# Patient Record
Sex: FEMALE | Race: WHITE | NOT HISPANIC OR LATINO | Employment: FULL TIME | URBAN - METROPOLITAN AREA
[De-identification: names, ages, dates, MRNs, and addresses within clinical notes are randomized per-mention and may not be internally consistent; named-entity substitution may affect disease eponyms.]

---

## 2017-04-30 ENCOUNTER — HOSPITAL ENCOUNTER (INPATIENT)
Facility: CLINIC | Age: 32
LOS: 2 days | Discharge: HOME | DRG: 897 | End: 2017-05-02
Attending: EMERGENCY MEDICINE | Admitting: PSYCHIATRY & NEUROLOGY
Payer: COMMERCIAL

## 2017-04-30 DIAGNOSIS — G89.29 CHRONIC NONMALIGNANT PAIN: Primary | Chronic | ICD-10-CM

## 2017-04-30 DIAGNOSIS — F11.23 OPIOID DEPENDENCE WITH WITHDRAWAL (H): ICD-10-CM

## 2017-04-30 DIAGNOSIS — F11.20 OPIOID USE DISORDER, SEVERE, DEPENDENCE (H): Chronic | ICD-10-CM

## 2017-04-30 PROBLEM — F11.93 OPIATE WITHDRAWAL (H): Status: ACTIVE | Noted: 2017-04-30

## 2017-04-30 LAB
AMPHETAMINES UR QL SCN: ABNORMAL
BARBITURATES UR QL: ABNORMAL
BENZODIAZ UR QL: ABNORMAL
CANNABINOIDS UR QL SCN: ABNORMAL
COCAINE UR QL: ABNORMAL
ETHANOL UR QL SCN: ABNORMAL
HCG UR QL: NEGATIVE
OPIATES UR QL SCN: ABNORMAL

## 2017-04-30 PROCEDURE — 99285 EMERGENCY DEPT VISIT HI MDM: CPT | Mod: 25 | Performed by: EMERGENCY MEDICINE

## 2017-04-30 PROCEDURE — 81025 URINE PREGNANCY TEST: CPT | Performed by: FAMILY MEDICINE

## 2017-04-30 PROCEDURE — 25000132 ZZH RX MED GY IP 250 OP 250 PS 637: Performed by: PSYCHIATRY & NEUROLOGY

## 2017-04-30 PROCEDURE — 12800008 ZZH R&B CD ADULT

## 2017-04-30 PROCEDURE — HZ2ZZZZ DETOXIFICATION SERVICES FOR SUBSTANCE ABUSE TREATMENT: ICD-10-PCS | Performed by: PSYCHIATRY & NEUROLOGY

## 2017-04-30 PROCEDURE — 80320 DRUG SCREEN QUANTALCOHOLS: CPT | Performed by: FAMILY MEDICINE

## 2017-04-30 PROCEDURE — 25000132 ZZH RX MED GY IP 250 OP 250 PS 637: Performed by: EMERGENCY MEDICINE

## 2017-04-30 PROCEDURE — 80307 DRUG TEST PRSMV CHEM ANLYZR: CPT | Performed by: FAMILY MEDICINE

## 2017-04-30 PROCEDURE — 99285 EMERGENCY DEPT VISIT HI MDM: CPT | Mod: Z6 | Performed by: EMERGENCY MEDICINE

## 2017-04-30 RX ORDER — ALUMINA, MAGNESIA, AND SIMETHICONE 2400; 2400; 240 MG/30ML; MG/30ML; MG/30ML
30 SUSPENSION ORAL EVERY 4 HOURS PRN
Status: DISCONTINUED | OUTPATIENT
Start: 2017-04-30 | End: 2017-05-02 | Stop reason: HOSPADM

## 2017-04-30 RX ORDER — OXYCODONE HYDROCHLORIDE 20 MG/1
1 TABLET ORAL EVERY 6 HOURS PRN
Status: ON HOLD | COMMUNITY
End: 2017-05-02

## 2017-04-30 RX ORDER — NAPROXEN 500 MG/1
500 TABLET ORAL 2 TIMES DAILY PRN
Status: DISCONTINUED | OUTPATIENT
Start: 2017-04-30 | End: 2017-05-02 | Stop reason: HOSPADM

## 2017-04-30 RX ORDER — NICOTINE 21 MG/24HR
1 PATCH, TRANSDERMAL 24 HOURS TRANSDERMAL DAILY
Status: DISCONTINUED | OUTPATIENT
Start: 2017-04-30 | End: 2017-04-30 | Stop reason: ALTCHOICE

## 2017-04-30 RX ORDER — BUPRENORPHINE 2 MG/1
4 TABLET SUBLINGUAL 2 TIMES DAILY
Status: DISCONTINUED | OUTPATIENT
Start: 2017-04-30 | End: 2017-05-02

## 2017-04-30 RX ORDER — HYDROXYZINE HYDROCHLORIDE 25 MG/1
25-50 TABLET, FILM COATED ORAL EVERY 4 HOURS PRN
Status: DISCONTINUED | OUTPATIENT
Start: 2017-04-30 | End: 2017-05-02 | Stop reason: HOSPADM

## 2017-04-30 RX ORDER — BISACODYL 10 MG
10 SUPPOSITORY, RECTAL RECTAL DAILY PRN
Status: DISCONTINUED | OUTPATIENT
Start: 2017-04-30 | End: 2017-05-02 | Stop reason: HOSPADM

## 2017-04-30 RX ORDER — TRAZODONE HYDROCHLORIDE 50 MG/1
50 TABLET, FILM COATED ORAL
Status: DISCONTINUED | OUTPATIENT
Start: 2017-04-30 | End: 2017-05-02 | Stop reason: HOSPADM

## 2017-04-30 RX ORDER — NAPROXEN 500 MG/1
500 TABLET ORAL PRN
Status: ON HOLD | COMMUNITY
End: 2017-05-02

## 2017-04-30 RX ORDER — IBUPROFEN 600 MG/1
600 TABLET, FILM COATED ORAL ONCE
Status: COMPLETED | OUTPATIENT
Start: 2017-04-30 | End: 2017-04-30

## 2017-04-30 RX ORDER — ACETAMINOPHEN 325 MG/1
650 TABLET ORAL EVERY 4 HOURS PRN
Status: DISCONTINUED | OUTPATIENT
Start: 2017-04-30 | End: 2017-05-02 | Stop reason: HOSPADM

## 2017-04-30 RX ORDER — NALOXONE HYDROCHLORIDE 0.4 MG/ML
.1-.4 INJECTION, SOLUTION INTRAMUSCULAR; INTRAVENOUS; SUBCUTANEOUS
Status: DISCONTINUED | OUTPATIENT
Start: 2017-04-30 | End: 2017-05-02 | Stop reason: HOSPADM

## 2017-04-30 RX ADMIN — IBUPROFEN 600 MG: 600 TABLET ORAL at 12:09

## 2017-04-30 RX ADMIN — BUPRENORPHINE HCL 4 MG: 2 TABLET SUBLINGUAL at 20:19

## 2017-04-30 RX ADMIN — NICOTINE POLACRILEX 8 MG: 4 GUM, CHEWING ORAL at 20:29

## 2017-04-30 RX ADMIN — TRAZODONE HYDROCHLORIDE 50 MG: 50 TABLET ORAL at 23:07

## 2017-04-30 RX ADMIN — NICOTINE 1 PATCH: 21 PATCH, EXTENDED RELEASE TRANSDERMAL at 16:37

## 2017-04-30 RX ADMIN — NAPROXEN 500 MG: 500 TABLET ORAL at 20:19

## 2017-04-30 RX ADMIN — BUPRENORPHINE HCL 4 MG: 2 TABLET SUBLINGUAL at 15:04

## 2017-04-30 ASSESSMENT — ENCOUNTER SYMPTOMS
SHORTNESS OF BREATH: 0
CHILLS: 1
EYE REDNESS: 0
HEADACHES: 0
ARTHRALGIAS: 0
CONFUSION: 0
COLOR CHANGE: 0
DIFFICULTY URINATING: 0
ABDOMINAL PAIN: 0
NECK STIFFNESS: 0
FEVER: 0

## 2017-04-30 ASSESSMENT — ACTIVITIES OF DAILY LIVING (ADL)
GROOMING: INDEPENDENT
DRESS: INDEPENDENT
ORAL_HYGIENE: INDEPENDENT
GROOMING: INDEPENDENT
LAUNDRY: WITH SUPERVISION

## 2017-04-30 NOTE — PLAN OF CARE
Problem: Substance Withdrawal  Goal: Substance Withdrawal  Signs and symptoms of listed problems will be absent or manageable.  SUBSTANCE WITHDRAWAL CAREPLAN GOALS    1) Patient will achieve medical stabilization of acute withdrawal sx.  2) Patient will remain safe and free from injury  3) Patient will demonstrate improvement of ADLs (appetite, hygiene)  4) Verbalize reduction of fear or anxiety to a manageable level.  5) Verbalize knowledge of substance abuse as a disease  6) Verbalize risks and negative effects related to drug ingestion  7) Demonstrate participation in unit programming and attends specific substance use group therapy (i.e AA meetings)  8) Accept referral to substance abuse treatment  9) Express sense of regaining some control of situation/life (possible by verbalizing alternative coping mechanisms as alternatives to substance use in response to stress)       S:  The patient is a 31 year-old  female who comes to us from the ED to withdraw from opiates.  She has been on Oxycodone for 8 years for back pain and is taking 20 mg a day per her doctor.  In the past year she has begun smoking heroin 1/4-1/2 of a gram a day.  Her last use was yesterday at noon.  In addition she snorts cocaine.  Her last use was a couple of weeks ago.  She also uses occasional Adderall.  Her last use was a few days ago.  Her COWS was a 9  On admission.  B:  The patient grew up in [place].  Her father is  and her mother is alive.  She states that she believes that her mother is an alcoholic.  She has one sister who brought her to the ED.  She has lived with her fiance for the past 17 years and they have 2 children together, an 11 year-old and a 9 year-old.  The patient graduated from high school and has worked in the past doing  work.  She is currently unemployed.  Her SO is a  and is also abusing heroin.  She states that her stressors are money, CD issues and employment.  She denies  that she has ever been in treatment or had an inpatient stay from .  The ED stated that she had a history of being on Prozac and Buspar.  She has arthritis and some degenerative disc problems.  A:  The patient is now in withdrawal.  She denies that she is depressed or that she has every had SI or SIB.  She also denies that she has issues with anxiety.    R:  Continue to monitor her withdrawal and to medicate prn.

## 2017-04-30 NOTE — IP AVS SNAPSHOT
MRN:[id number]                      After Visit Summary   4/30/2017    Mecca Thorne    MRN: [id number]           Thank you!     Thank you for choosing [facility] for your care. Our goal is always to provide you with excellent care.        Patient Information     Date Of Birth          1985        Designated Caregiver       Most Recent Value    Caregiver    Will someone help with your care after discharge? no      About your hospital stay     You were admitted on:  April 30, 2017 You last received care in the:  [facility]    You were discharged on:  May 2, 2017       Who to Call     For medical emergencies, please call 911.  For non-urgent questions about your medical care, please call your primary care provider or clinic, [phone]          Attending Provider     Provider Specialty    Satish Sanon MD Emergency Medicine    Luis Alberto, Deepali Iniguez MD Pediatrics       Primary Care Provider Office Phone # Fax #    M Erum Marx [phone]       [facility] [address] [place] [id number]        Your next 10 appointments already scheduled     May 02, 2017  2:00 PM CDT   Treatment with LP/DOP ADMISSIONS   [facility] ([facility])    [address] [id number] [phone]            May 10, 2017  3:40 PM CDT   New Visit with Mecca Byrne MD   [facility] ([facility])    [address] [place] [address]  [address]  [facility] [id number] [phone]            May 11, 2017 12:00 PM CDT   Level 1 with UR BD 01   [facility] ([facility])    [address] [place] [address] S.  [address]  [facility] [address]   [phone]              Further instructions from your care team       Behavioral [facility] Discharge Planning and  Instructions  THANK YOU FOR CHOOSING THE [facility]  [facility]: [phone]    Summary: You were admitted to and processed through [facility] on April 30, 2017 for detoxification from opioids.  A medical examination was performed that included lab work. You have met with a  and opted to begin intensive outpatient treatment with lodging at [facility].  Please make your recovery a priority, Miss Thorne.    Recommendation:  Residential Treatment, psychotherapy, sober support group engagement and active work with a sponsor or  through [facility] (see below for contact information).    Main Diagnosis:  Opioid Dependence    Major Treatments, Procedures and Findings:  You were detoxified from opioids using the appropriate protocol(s). You have had a chemical dependency assessment.  You have had blood drawn, and the results have been reviewed with you.  Please take a copy of your laboratory work with you to your next provider appointment.    Symptoms to Report:  If you experience more anxiety, confusion, sleeplessness, deep sadness or thoughts of suicide, notify your treatment team or notify your primary care physician. IF THE SYMPTOMS YOU ARE EXPERIENCING ARE A MEDICAL EMERGENCY, CALL 911 IMMEDIATELY.     Lifestyle Adjustment: Adjust your lifestyle to get enough sleep, relaxation, exercise and excellent nutrition. Continue to develop healthy coping skills to decrease stress and promote a sober living environment. Do not use alcohol, illegal drugs or addictive medications other than what is currently prescribed. AA, NA, and a sponsor are excellent resources for support.     Primary Provider: Dr. Marx of [facility])  Patient states that she will finalize appointment time once secured in [facility].     Resources:  [facility] [phone] Support Group:  AA/NA and Sponsor/support.  Crisis Intervention:  307.732.6222 or 187-769-3548 (TTY: [phone]). Call anytime for help.  National [place] on Mental Illness (www.[place].salima.org): 301.726.6876 or 478-299-3476.  Alcoholics Anonymous (www.alcoholics-anonymous.org): Check your phone book for your local chapter.  Suicide Awareness Voices of Education (www.save.org): 613-865-GLWB (7004)  National Suicide Prevention Line (www.mentalhealthmn.org): 487-807-BZFB (1502)  Mental Health Consumer/Survivor Network of [place] (www.mhcsn.net): 364.552.8198 or 593-079-8358.  Mental Health Association of [place] (www.mentalhealth.org): 632.592.9646 or 051-481-6857  Substance Abuse and Mental Health Services. (www.samhsa.gov)    [facility] ([facility])  [facility] connects people seeking recovery to resources that help foster and sustain long-term recovery.  Whether you are seeking resources for treatment, transportation, housing, job training, education, health care or other pathways to recovery, [facility] is a great place to start. 447.514.8260 www.[facility].org    General Medication Instruction: See your medication papers for instructions. Take all medicines as directed.  Make no changes unless your doctor suggests them. Go to all your doctor visits.  Be sure to have all your required lab tests. This way, your medicines may be refilled on time. Do not use any drugs not prescribed by your provider. AA/NA and sponsors are excellent resources for support. Avoid alcohol at all costs!    Please Note:  If you have any questions at anytime after you are discharged please call the [facility] [place] detoxification [facility] at [phone].  [facility], Behavioral Intake [phone]. Please take this discharge folder with you to all your follow up appointments, it contains your lab results, diagnosis, medication list and discharge recommendations. THANK YOU FOR CHOOSING THE [facility]        Pending Results     Date and  "Time Order Name Status Description    2017 0816 Hepatitis C Screen Reflex to HCV RNA Quant and Genotype In process     2017 0816 Hepatitis B surface antigen In process     2017 0816 Hepatitis B core antibody In process             Admission Information     Date & Time Provider Department Dept. Phone    2017 Deepali Sahu MD [facility] [phone]      Your Vitals Were     Blood Pressure Pulse Temperature Respirations Height Weight    113/69 78 98.9  F (37.2  C) 16 1.829 m (6') 74.1 kg (163 lb 6.4 oz)    Last Period Pulse Oximetry BMI (Body Mass Index)             2017 (Exact Date) 100% 22.16 kg/m2         [facility] Information     [employer] lets you send messages to your doctor, view your test results, renew your prescriptions, schedule appointments and more. To sign up, go to [email or web address] . Click on \"Log in\" on the left side of the screen, which will take you to the Welcome page. Then click on \"Sign up Now\" on the right side of the page.     You will be asked to enter the access code listed below, as well as some personal information. Please follow the directions to create your username and password.     Your access code is: [email or web address]  Expires: 2017  1:10 PM     Your access code will  in 90 days. If you need help or a new code, please call your [facility] clinic or [phone].        Care EveryWhere ID     This is your Care EveryWhere ID. This could be used by other organizations to access your [facility] medical records  [id number]           Review of your medicines      START taking        Dose / Directions    acetaminophen 325 MG tablet   Commonly known as:  TYLENOL        Dose:  325-650 mg   Take 1-2 tablets (325-650 mg) by mouth every 4 hours as needed for mild pain or fever   Quantity:  100 tablet   Refills:  0       gabapentin 300 MG capsule   Commonly known as:  NEURONTIN        Dose:  300 mg   Take 1 capsule (300 mg) by mouth " 3 times daily as needed (anxiety)   Quantity:  90 capsule   Refills:  0       hydrOXYzine 25 MG tablet   Commonly known as:  ATARAX        Dose:  25-50 mg   Take 1-2 tablets (25-50 mg) by mouth every 6 hours as needed for anxiety   Quantity:  120 tablet   Refills:  0       naloxone 1 mg/mL for intranasal kit (2 syringes with 2 mucosal atomizer device)   Commonly known as:  NARCAN        For opioid overdose, spray one-half syringe contents into each nostril.  Repeat after 3 minutes if no or minimal response.   Quantity:  1 kit   Refills:  1       tiZANidine 2 MG tablet   Commonly known as:  ZANAFLEX        Dose:  2 mg   Take 1 tablet (2 mg) by mouth every 8 hours as needed for other (opioid withdrawal symptoms)   Quantity:  12 tablet   Refills:  0       traZODone 50 MG tablet   Commonly known as:  DESYREL        Dose:  50 mg   Take 1 tablet (50 mg) by mouth nightly as needed for sleep   Quantity:  30 tablet   Refills:  0         CONTINUE these medicines which may have CHANGED, or have new prescriptions. If we are uncertain of the size of tablets/capsules you have at home, strength may be listed as something that might have changed.        Dose / Directions    naproxen 500 MG tablet   Commonly known as:  NAPROSYN   This may have changed:  when to take this        Dose:  500 mg   Take 1 tablet (500 mg) by mouth 2 times daily as needed for moderate pain   Quantity:  60 tablet   Refills:  0         STOP taking     oxyCODONE HCl 20 MG Tabs immediate release tablet   Commonly known as:  ROXICODONE                Where to get your medicines      These medications were sent to [facility] [place], [place] - [address]  [address] [address]     Phone:  [phone]     acetaminophen 325 MG tablet    gabapentin 300 MG capsule    hydrOXYzine 25 MG tablet    naloxone 1 mg/mL for intranasal kit (2 syringes with 2 mucosal atomizer device)    naproxen 500 MG tablet    tiZANidine 2 MG  tablet    traZODone 50 MG tablet                Protect others around you: Learn how to safely use, store and throw away your medicines at www.disposemymeds.org.             Medication List: This is a list of all your medications and when to take them. Check marks below indicate your daily home schedule. Keep this list as a reference.      Medications           Morning Afternoon Evening Bedtime As Needed    acetaminophen 325 MG tablet   Commonly known as:  TYLENOL   Take 1-2 tablets (325-650 mg) by mouth every 4 hours as needed for mild pain or fever                                gabapentin 300 MG capsule   Commonly known as:  NEURONTIN   Take 1 capsule (300 mg) by mouth 3 times daily as needed (anxiety)                                hydrOXYzine 25 MG tablet   Commonly known as:  ATARAX   Take 1-2 tablets (25-50 mg) by mouth every 6 hours as needed for anxiety   Last time this was given:  50 mg on 5/2/2017  8:28 AM                                naloxone 1 mg/mL for intranasal kit (2 syringes with 2 mucosal atomizer device)   Commonly known as:  NARCAN   For opioid overdose, spray one-half syringe contents into each nostril.  Repeat after 3 minutes if no or minimal response.                                naproxen 500 MG tablet   Commonly known as:  NAPROSYN   Take 1 tablet (500 mg) by mouth 2 times daily as needed for moderate pain   Last time this was given:  500 mg on 5/1/2017  4:05 PM                                tiZANidine 2 MG tablet   Commonly known as:  ZANAFLEX   Take 1 tablet (2 mg) by mouth every 8 hours as needed for other (opioid withdrawal symptoms)                                traZODone 50 MG tablet   Commonly known as:  DESYREL   Take 1 tablet (50 mg) by mouth nightly as needed for sleep   Last time this was given:  50 mg on 5/1/2017  9:59 PM

## 2017-04-30 NOTE — ED PROVIDER NOTES
History     Chief Complaint   Patient presents with     Addiction Problem     seeking detox from heroin use     HPI  Mecca Thorne is a 31 year old female who presents to the Emergency Department seeking detox from heroin. Patient states that she first started using opiates in pill form about 8-9 years ago and began using heroin about a year ago. She smokes 1/4-1/2 gram of heroine daily with her last use being yesterday around 1:00pm. She typically uses heroin 1-2x/day. Patient states she has also been diagnosed with arthritis and herniated discs in her lower back which she treats with Oxycodone. She has a prescription for Oxycodone 20mg tablets to be taken every 4-6 hours as needed. She reports using greater than the amount prescribed in the past. She takes the Oxycodone simultaneously with heroin use. She denies a history of back surgeries but states she has received injections. Patient states she began calling treatment centers about a month ago but she has never been in detox or received treatment in the past. She states that she is currently experiencing withdrawal symptoms in the form of hot and cold flashes in addition to restless legs. She last experienced these withdrawal symptoms about a week ago when she ran out of medication and didn't use anything for a day. She denies use of other drugs. She denies additional medical problems other than depression and anxiety which has been treated with Prozac and Buspar in the past, but is not treated with anything currently. She last took these medications a couple of years ago.     PAST MEDICAL HISTORY  History reviewed. No pertinent past medical history.  PAST SURGICAL HISTORY  History reviewed. No pertinent surgical history.  FAMILY HISTORY  No family history on file.  SOCIAL HISTORY  Social History   Substance Use Topics     Smoking status: Current Every Day Smoker     Packs/day: 0.50     Years: 10.00     Smokeless tobacco: Never Used     Alcohol use Yes       Comment: occasional     MEDICATIONS  No current facility-administered medications for this encounter.      Current Outpatient Prescriptions   Medication     oxyCODONE HCl (ROXICODONE) 20 MG TABS immediate release tablet     naproxen (NAPROSYN) 500 MG tablet     ALLERGIES  No Known Allergies    I have reviewed the Medications, Allergies, Past Medical and Surgical History, and Social History in the Epic system.    Review of Systems   Constitutional: Positive for chills. Negative for fever.   HENT: Negative for congestion.    Eyes: Negative for redness.   Respiratory: Negative for shortness of breath.    Cardiovascular: Negative for chest pain.   Gastrointestinal: Negative for abdominal pain.   Genitourinary: Negative for difficulty urinating.   Musculoskeletal: Negative for arthralgias and neck stiffness.   Skin: Negative for color change.   Neurological: Negative for headaches.   Psychiatric/Behavioral: Negative for confusion.   All other systems reviewed and are negative.      Physical Exam   BP: 120/74  Pulse: 80  Temp: 98.3  F (36.8  C)  Resp: 16  Weight: 74.1 kg (163 lb 6.4 oz)  SpO2: 100 %  Physical Exam   Constitutional: No distress.   HENT:   Head: Atraumatic.   Mouth/Throat: Oropharynx is clear and moist. No oropharyngeal exudate.   Eyes: Pupils are equal, round, and reactive to light. No scleral icterus.   Cardiovascular: Normal heart sounds and intact distal pulses.    Pulmonary/Chest: Breath sounds normal. No respiratory distress.   Abdominal: Soft. Bowel sounds are normal. There is no tenderness.   Musculoskeletal: She exhibits no edema or tenderness.   Skin: Skin is warm. No rash noted. She is not diaphoretic.   Psychiatric: Her speech is normal. Judgment and thought content normal. She exhibits a depressed mood.       ED Course     ED Course   9:28 AM  The patient was seen and examined by Dr. Sanon in Room 17.     Procedures               Labs Ordered and Resulted from Time of ED Arrival Up to the Time  of Departure from the ED   DRUG ABUSE SCREEN 6 CHEM DEP URINE ([facility]) - Abnormal; Notable for the following:        Result Value    Amphetamine Qual Urine   (*)     Value: Positive   Cutoff for a positive amphetamine is greater than 500 ng/mL. This is an   unconfirmed screening result to be used for medical purposes only.      Opiates Qualitative Urine   (*)     Value: Positive   Cutoff for a positive opiate is greater than 300 ng/mL. This is an unconfirmed   screening result to be used for medical purposes only.      All other components within normal limits   HCG QUALITATIVE URINE   ENCOURAGE FLUIDS          Results for orders placed or performed during the hospital encounter of 04/30/17   Drug abuse screen 6 urine (chem dep)   Result Value Ref Range    Amphetamine Qual Urine (A) NEG     Positive   Cutoff for a positive amphetamine is greater than 500 ng/mL. This is an   unconfirmed screening result to be used for medical purposes only.      Barbiturates Qual Urine  NEG     Negative   Cutoff for a negative barbiturate is 200 ng/mL or less.      Benzodiazepine Qual Urine  NEG     Negative   Cutoff for a negative benzodiazepine is 200 ng/mL or less.      Cannabinoids Qual Urine  NEG     Negative   Cutoff for a negative cannabinoid is 50 ng/mL or less.      Cocaine Qual Urine  NEG     Negative   Cutoff for a negative cocaine is 300 ng/mL or less.      Ethanol Qual Urine  NEG     Negative   Cutoff for a negative urine ethanol is 0.05 g/dL or less      Opiates Qualitative Urine (A) NEG     Positive   Cutoff for a positive opiate is greater than 300 ng/mL. This is an unconfirmed   screening result to be used for medical purposes only.     HCG qualitative urine   Result Value Ref Range    HCG Qual Urine Negative NEG       Assessments & Plan (with Medical Decision Making)   31-year-old female on opiates long-standing for chronic pain presents requesting detox.  She has been using heroin almost daily in addition to  oxycodone 80 mg a day for nearly one year.  Urine toxicology screen is positive for opiates and amphetamines.  Urine pregnancy test was negative.  Given the length of time as well as amount of opiate use, patient is at risk for progressive withdrawal.  She will be admitted to detox for further evaluation and management.    I have reviewed the nursing notes.    I have reviewed the findings, diagnosis, plan and need for follow up with the patient.    New Prescriptions    No medications on file       Final diagnoses:   Opioid dependence with withdrawal (H)   IHayley, am serving as a trained medical scribe to document services personally performed by Satish Sanon MD, based on the provider's statements to me.   Satish RIVERA MD, was physically present and have reviewed and verified the accuracy of this note documented by Hayley Glover.      4/30/2017   [facility], [place], EMERGENCY DEPARTMENT     Satish Sanon MD  04/30/17 1111

## 2017-04-30 NOTE — IP AVS SNAPSHOT
[facility]    [address] [place] [id number]    Phone:  [phone]                                       After Visit Summary   4/30/2017    Mecca Thorne    MRN: [id number]           After Visit Summary Signature Page     I have received my discharge instructions, and my questions have been answered. I have discussed any challenges I see with this plan with the nurse or doctor.    ..........................................................................................................................................  Patient/Patient Representative Signature      ..........................................................................................................................................  Patient Representative Print Name and Relationship to Patient    ..................................................               ................................................  Date                                            Time    ..........................................................................................................................................  Reviewed by Signature/Title    ...................................................              ..............................................  Date                                                            Time

## 2017-04-30 NOTE — PROGRESS NOTES
04/30/17 1425   Patient Belongings   Did you bring any home meds/supplements to the hospital?  No   Patient Belongings clothing   Disposition of Belongings see note   Belongings Search Yes   Clothing Search Yes     Duffle, shoes, coat in storage    cigs in sharps    Nothing in locked drawer    Visa, SS, Med card in security    ADMISSION:  I am responsible for any personal items that are not sent to the safe or pharmacy. [facility] is not responsible for loss, theft or damage of any property in my possession.    Patient Signature _____________________ Date/Time _____________________    Staff Signature _______________________ Date/Time _____________________    [facility] Staff person, if patient is unable/unwilling to sign  ___________________________________ Date/Time _____________________  DISCHARGE:  All personal items have been returned to me.    Patient Signature _____________________ Date/Time _____________________    Staff Signature _______________________ Date/Time _____________________

## 2017-05-01 ENCOUNTER — TELEPHONE (OUTPATIENT)
Dept: BEHAVIORAL HEALTH | Facility: CLINIC | Age: 32
End: 2017-05-01

## 2017-05-01 PROBLEM — F11.20 OPIOID USE DISORDER, SEVERE, DEPENDENCE (H): Chronic | Status: ACTIVE | Noted: 2017-05-01

## 2017-05-01 PROBLEM — F11.20 OPIOID USE DISORDER, SEVERE, DEPENDENCE (H): Status: ACTIVE | Noted: 2017-05-01

## 2017-05-01 PROBLEM — G89.29 CHRONIC NONMALIGNANT PAIN: Chronic | Status: ACTIVE | Noted: 2017-05-01

## 2017-05-01 PROBLEM — F11.23 OPIOID DEPENDENCE WITH WITHDRAWAL (H): Status: ACTIVE | Noted: 2017-04-30

## 2017-05-01 PROBLEM — G89.29 CHRONIC NONMALIGNANT PAIN: Status: ACTIVE | Noted: 2017-05-01

## 2017-05-01 LAB
ALBUMIN SERPL-MCNC: 3.3 G/DL (ref 3.4–5)
ALP SERPL-CCNC: 42 U/L (ref 40–150)
ALT SERPL W P-5'-P-CCNC: 11 U/L (ref 0–50)
ANION GAP SERPL CALCULATED.3IONS-SCNC: 6 MMOL/L (ref 3–14)
AST SERPL W P-5'-P-CCNC: 8 U/L (ref 0–45)
BILIRUB SERPL-MCNC: 0.7 MG/DL (ref 0.2–1.3)
BUN SERPL-MCNC: 7 MG/DL (ref 7–30)
CALCIUM SERPL-MCNC: 8.4 MG/DL (ref 8.5–10.1)
CHLORIDE SERPL-SCNC: 112 MMOL/L (ref 94–109)
CO2 SERPL-SCNC: 28 MMOL/L (ref 20–32)
CREAT SERPL-MCNC: 0.92 MG/DL (ref 0.52–1.04)
ERYTHROCYTE [DISTWIDTH] IN BLOOD BY AUTOMATED COUNT: 13.3 % (ref 10–15)
GFR SERPL CREATININE-BSD FRML MDRD: 71 ML/MIN/1.7M2
GLUCOSE SERPL-MCNC: 96 MG/DL (ref 70–99)
HCT VFR BLD AUTO: 41.8 % (ref 35–47)
HGB BLD-MCNC: 13.8 G/DL (ref 11.7–15.7)
MCH RBC QN AUTO: 30 PG (ref 26.5–33)
MCHC RBC AUTO-ENTMCNC: 33 G/DL (ref 31.5–36.5)
MCV RBC AUTO: 91 FL (ref 78–100)
PLATELET # BLD AUTO: 212 10E9/L (ref 150–450)
POTASSIUM SERPL-SCNC: 4 MMOL/L (ref 3.4–5.3)
PROT SERPL-MCNC: 6.1 G/DL (ref 6.8–8.8)
RBC # BLD AUTO: 4.6 10E12/L (ref 3.8–5.2)
SODIUM SERPL-SCNC: 146 MMOL/L (ref 133–144)
WBC # BLD AUTO: 8.1 10E9/L (ref 4–11)

## 2017-05-01 PROCEDURE — 86803 HEPATITIS C AB TEST: CPT | Performed by: PSYCHIATRY & NEUROLOGY

## 2017-05-01 PROCEDURE — 99223 1ST HOSP IP/OBS HIGH 75: CPT | Mod: AI | Performed by: PSYCHIATRY & NEUROLOGY

## 2017-05-01 PROCEDURE — 25000132 ZZH RX MED GY IP 250 OP 250 PS 637: Performed by: PSYCHIATRY & NEUROLOGY

## 2017-05-01 PROCEDURE — 86704 HEP B CORE ANTIBODY TOTAL: CPT | Performed by: PSYCHIATRY & NEUROLOGY

## 2017-05-01 PROCEDURE — 85027 COMPLETE CBC AUTOMATED: CPT | Performed by: PSYCHIATRY & NEUROLOGY

## 2017-05-01 PROCEDURE — 87340 HEPATITIS B SURFACE AG IA: CPT | Performed by: PSYCHIATRY & NEUROLOGY

## 2017-05-01 PROCEDURE — 36415 COLL VENOUS BLD VENIPUNCTURE: CPT | Performed by: PSYCHIATRY & NEUROLOGY

## 2017-05-01 PROCEDURE — 80053 COMPREHEN METABOLIC PANEL: CPT | Performed by: PSYCHIATRY & NEUROLOGY

## 2017-05-01 PROCEDURE — 12800008 ZZH R&B CD ADULT

## 2017-05-01 RX ADMIN — NICOTINE POLACRILEX 8 MG: 4 GUM, CHEWING ORAL at 20:13

## 2017-05-01 RX ADMIN — NICOTINE POLACRILEX 8 MG: 4 GUM, CHEWING ORAL at 11:28

## 2017-05-01 RX ADMIN — BUPRENORPHINE HCL 4 MG: 2 TABLET SUBLINGUAL at 20:13

## 2017-05-01 RX ADMIN — BUPRENORPHINE HCL 4 MG: 2 TABLET SUBLINGUAL at 08:22

## 2017-05-01 RX ADMIN — NAPROXEN 500 MG: 500 TABLET ORAL at 16:05

## 2017-05-01 RX ADMIN — HYDROXYZINE HYDROCHLORIDE 50 MG: 25 TABLET ORAL at 16:05

## 2017-05-01 RX ADMIN — TRAZODONE HYDROCHLORIDE 50 MG: 50 TABLET ORAL at 21:59

## 2017-05-01 RX ADMIN — NAPROXEN 500 MG: 500 TABLET ORAL at 11:28

## 2017-05-01 RX ADMIN — NICOTINE POLACRILEX 8 MG: 4 GUM, CHEWING ORAL at 16:07

## 2017-05-01 RX ADMIN — NICOTINE POLACRILEX 8 MG: 4 GUM, CHEWING ORAL at 08:22

## 2017-05-01 ASSESSMENT — ACTIVITIES OF DAILY LIVING (ADL)
GROOMING: INDEPENDENT
ORAL_HYGIENE: INDEPENDENT
DRESS: SCRUBS (BEHAVIORAL HEALTH)
LAUNDRY: WITH SUPERVISION
GROOMING: INDEPENDENT

## 2017-05-01 NOTE — PROGRESS NOTES
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 concerned for your safety there? no.    5D. Are you concerned about the safety of anyone else who lives with you? no.    6A. Do you have children who live with you? Yes.  (Ask follow-up questions to determine the person s relationship and responsibility, both legal and care giving; and what arrangements are made for supervision for the children when the person is not available.) Daughter - Florentin Field (daughter) 11 years old & Shelton Field (son) 9 years old.    6B. Do you have children who do not live with you? No    7A. Who supports you in making changes in your alcohol or drug use? What are they willing to do to support you? Who is upset or angry about you making changes in your alcohol or drug use? How big a problem is this for you?      My family and friends are supportive. I am sure some would help if I needed something and if they knew I was working hard on my sobriety.     7B. This table is provided to record information about the person s relationships and available support It is not necessary to ask each item; only to get a comprehensive picture of their support system.  How often can you count on the following people when you need someone?   Partner / Spouse Usually supportive   Parent(s)/Aunt(s)/Uncle(s)/Grandparents  Always supportive   Sibling(s)/Cousin(s) Always supportive   Child(renee) Always supportive   Other relative(s) Always supportive   Friend(s)/neighbor(s) Usually supportive   Child(renee) s father(s)/mother(s) Usually supportive   Support group member(s) N/A   Community of susanna members N/A   /counselor/therapist/healer N/A   Other (specify) N/A     8A. What is your current living situation?     I am currently living with my fiancee and 2 kids.    8B. What is your long term plan for where you will be living?     I would like to continue living at home with my family.    8C. Tell me about your living environment/neighborhood? Ask enough follow up questions to determine safety, criminal activity, availability of alcohol and drugs, supportive or antagonistic to the person making changes.      Everything is safe and I have no concerns.     9. Criminal justice history: Gather current/recent history and any significant history related to substance use--Arrests? Convictions? Circumstances? Alcohol or drug involvement? Sentences? Still on probation or parole? Expectations of the court? Current court order? Any sex offenses - lifetime? What level? (DSM)    NA    10. What obstacles exist to participating in treatment? (Time off work, childcare, funding, transportation, pending group home time, living situation)     Finances    Dimension VI Ratings   Recovery environment - The placing authority must use the criteria in Dimension VI to determine a client s recovery environment.   RISK DESCRIPTIONS - Severity rating: 3 Client is not engaged in structured, meaningful activity and the client s peers, family, significant other, and living environment are unsupportive, or there is significant criminal justice system involvement.    REASONS SEVERITY WAS ASSIGNED - (What support does the person have for making changes? What structure/stability does the person have in his or her daily life that will increase the likelihood that  changes can be sustained? What problems exist in the person s environment that will jeopardize getting/staying clean and sober?)     Patient reports that her current living situation is supportive towards her recovery. Pt reports that she is currently living with her yamila and their 2 children. Pt lacks a daily structure and meaningful activities. Pt reports that she is currently unemployed and is not attending school at this time. Pt reports fracturing relationships with family and friends due to her use. Pt lacks a sober support network. Pt denies that she has any  legal involvement.         Client Choice/Exceptions   Would you like services specific to language, age, gender, culture, Restorationist preference, race, ethnicity, sexual orientation or disability?  No    What particular treatment choices and options would you like to have? [facility].    Do you have a preference for a particular treatment program? [facility].    Criteria for Diagnosis     Criteria for Diagnosis  DSM-5 Criteria for Substance Use Disorder  Instructions: Determine whether the client currently meets the criteria for Substance Use Disorder using the diagnostic criteria in the DSM-V pp.481-583. Current means during the most recent 12 months outside a facility that controls access to substances    Category of Substance Severity (ICD-10 Code / DSM 5 Code)     Alcohol Use Disorder NA   Cannabis Use Disorder NA   Hallucinogen Use Disorder NA   Inhalant Use Disorder NA   Opioid Use Disorder Severe   (F11.20) (304.00)   Sedative, Hypnotic, or Anxiolytic Use Disorder NA   Stimulant Related Disorder NA   Tobacco Use Disorder Mild    (Z72.0) (305.1)   Other (or unknown) Substance Use Disorder NA     Collateral Contact Summary   Number of contacts made: 2    Contact with referring person:  N/A.    If court related records were reviewed, summarize here: N/A    Information from collateral contacts supported/largely  "agreed with information from the client and associated risk ratings.    Rule 25 Assessment Summary and Plan   's Recommendation    1)  Complete a residential based or similar treatment program similar to [facility].   2)  Abstain from all mood-altering chemicals unless prescribed by a licensed provider.   3)  Attend weekly 12-step support group meetings.     4)  Actively work with a female sponsor or  through [place] "[employer] ([phone]).   5)  Follow all the recommendations of your treatment/medical providers.  6)  Remain law abiding.  7)  Patient may benefit from obtaining a full mental health evaluation.  8)  Patient may benefit from 1:1 psychotherapy due to past MH diagnosis.       Collateral Contacts     Name:    Dr. NATALIA Sahu MD   Relationship:    3A Physician   Phone Number:    [phone] Releases:         \"This patient is a 31 year old partnered (has fiance), unemployed female, mother of two minor children, with mixed prescription opioid/heroin use disorder, complicated by nonmalignant pain concerns, who presented to our ED seeking detoxification and treatment. She has many year history of opioid use problems, dating back at least to 2008 when she was receiving regular monthly oxycodone (OxyContin) and acetaminophen-oxycodone prescriptions, yet visiting EDs with pain complaints when the medication ran out. She has progressed over at least past year to heroin (smoked) mixed with prescribed opioids. Denies IV use. PCP had been filling opioid prescriptions until past few when she was referred to a pain program. Patient indicates many years of lumbar disc herniation and related pain. Oxycodone products, steroid injections, and some PT have been used. She endorses intermittent amphetamine (Adderall) use, but presently denies methamphetamine use. She lives with feliberto and their two children. He is actively using heroin, and patient is appropriately anxious about " "how to stay focused on her own treatment/self-care even if he does not. \"We have been together 17 years; we are very co-dependent, I know that.\" Has taken SSRI and buspirone in past for anxiety/depression. She states she didn't take them very long, and isn't sure what depression/anxiety are like at baseline for her. Family, especially sister is concerned, and helped patient look at treatment programs, and called [facility] intake a number of times until bed was available. Patient denies other illness, no recent injury. Current withdrawal complaints are none, as last reported opioid was over 24 hours prior to this exam, and she received initial buprenorphine doses yesterday and this morning. She is ambivalent about maintenance buprenorphine, but does want residential treatment and is open to recommendations. She is admitted voluntarily.\"    Collateral Contacts     Name    Dr. KAUSHIK Sanon MD Relationship    ER Physician Phone Number    [phone] Releases           \"Mecca Thorne is a 31 year old female who presents to the Emergency Department seeking detox from heroin. Patient states that she first started using opiates in pill form about 8-9 years ago and began using heroin about a year ago. She smokes 1/4-1/2 gram of heroine daily with her last use being yesterday around 1:00pm. She typically uses heroin 1-2x/day. Patient states she has also been diagnosed with arthritis and herniated discs in her lower back which she treats with Oxycodone. She has a prescription for Oxycodone 20mg tablets to be taken every 4-6 hours as needed. She reports using greater than the amount prescribed in the past. She takes the Oxycodone simultaneously with heroin use. She denies a history of back surgeries but states she has received injections. Patient states she began calling treatment centers about a month ago but she has never been in detox or received treatment in the past. She states that she is currently experiencing withdrawal " "symptoms in the form of hot and cold flashes in addition to restless legs. She last experienced these withdrawal symptoms about a week ago when she ran out of medication and didn't use anything for a day. She denies use of other drugs. She denies additional medical problems other than depression and anxiety which has been treated with Prozac and Buspar in the past, but is not treated with anything currently. She last took these medications a couple of years ago.\"    Collateral Contacts     Name    [facility]'s EMR   Relationship     Phone Number     Releases           Information Provided:  This writer reviewed this patients Electronic Medical Record and the information reviewed largely supports the information the patient reported during her CD evaluation.    llateral Contacts      A problematic pattern of alcohol/drug use leading to clinically significant impairment or distress, as manifested by at least two of the following, occurring within a 12-month period:    Alcohol/drug is often taken in larger amounts or over a longer period than was intended.  There is a persistent desire or unsuccessful efforts to cut down or control alcohol/drug use  A great deal of time is spent in activities necessary to obtain alcohol, use alcohol, or recover from its effects.  Craving, or a strong desire or urge to use alcohol/drug  Recurrent alcohol/drug use resulting in a failure to fulfill major role obligations at work, school or home.  Continued alcohol use despite having persistent or recurrent social or interpersonal problems caused or exacerbated by the effects of alcohol/drug.  Important social, occupational, or recreational activities are given up or reduced because of alcohol/drug use.  Recurrent alcohol/drug use in situations in which it is physically hazardous.  Alcohol/drug use is continued despite knowledge of having a persistent or recurrent physical or psychological problem that is likely to have been caused or " exacerbated by alcohol.  Tolerance, as defined by either of the following: A need for markedly increased amounts of alcohol/drug to achieve intoxication or desired effect. and A markedly diminished effect with continued use of the same amount of alcohol/drug.  Withdrawal, as manifested by either of the following: The characteristic withdrawal syndrome for alcohol/drug (refer to Criteria A and B of the criteria set for alcohol/drug withdrawal). and Alcohol/drug (or a closely related substance, such as a benzodiazepine) is taken to relieve or avoid withdrawal symptoms.      Specify if: In early remission:  After full criteria for alcohol/drug use disorder were previously met, none of the criteria for alcohol/drug use disorder have been met for at least 3 months but for less than 12 months (with the exception that Criterion A4,  Craving or a strong desire or urge to use alcohol/drug  may be met).     In sustained remission:   After full criteria for alcohol use disorder were previously met, non of the criteria for alcohol/drug use disorder have been met at any time during a period of 12 months or longer (with the exception that Criterion A4,  Craving or strong desire or urge to use alcohol/drug  may be met).   Specify if:   This additional specifier is used if the individual is in an environment where access to alcohol is restricted.    Mild: Presence of 2-3 symptoms    Moderate: Presence of 4-5 symptoms    Severe: Presence of 6 or more symptoms

## 2017-05-01 NOTE — PROGRESS NOTES
Case Management Note  5/1/2017    Writer met with pt to initiate discharge planning. Pt reports she would like to go [facility] to [facility] for treatment. Pt reports she is still working on her intake paperwork. Pt encouraged to complete her intake paperwork for assessment purposes.    Addendum    Pt completed and turned in her intake paperwork. Writer and pt reviewed completed intake paperwork and completed assessment. Pt placed on Waiting List for [facility]. In Basket sent. Assessment faxed to [facility] for authorization.    Flaco Manjarrez MA, NORMAC

## 2017-05-01 NOTE — H&P
Addiction Medicine History and Physical    Mecca Thorne MRN# [id number]   Age: 31 year old YOB: 1985     Date of Admission:  4/30/2017  Date of H&P:   May 1, 2017    Home clinic: [facility]  Primary care provider: KYLER Marx MD          Assessment and Plan:   Assessment:   Principal Problem:    Opioid dependence with withdrawal (H), comfortable on initial buprenorphine  Active Problems:    Opioid use disorder, severe, dependence (H), mixed prescription opioid and heroin, contemplating maintenance    Chronic nonmalignant pain - lumbar disc herniation by history        Plan:   Admit to 3[facility]  Monitor and manage opioid withdrawal with comfort medication and buprenorphine, patient encouraged to seek maintenance as part of treatment plan  Supportive measures for back discomfort  Dispo planning ongoing; see CM notes for details, but patient desires residential program +/- maintenance buprenorphine; she would further benefit from individual therapy after primary treatment completed           Chief Complaint:   Opioid withdrawal     History is obtained from the patient, and chart review          History of Present Illness:   This patient is a 31 year old partnered (has fiance), unemployed female, mother of two minor children, with mixed prescription opioid/heroin use disorder, complicated by nonmalignant pain concerns, who presented to our ED seeking detoxification and treatment. She has many year history of opioid use problems, dating back at least to 2008 when she was receiving regular monthly oxycodone (OxyContin) and acetaminophen-oxycodone prescriptions, yet visiting EDs with pain complaints when the medication ran out. She has progressed over at least past year to heroin (smoked) mixed with prescribed opioids. Denies IV use. PCP had been filling opioid prescriptions until past few when she was referred to a pain program. Patient indicates many years of lumbar  "disc herniation and related pain. Oxycodone products, steroid injections, and some PT have been used. She endorses intermittent amphetamine (Adderall) use, but presently denies methamphetamine use. She lives with feliberto and their two children. He is actively using heroin, and patient is appropriately anxious about how to stay focused on her own treatment/self-care even if he does not. \"We have been together 17 years; we are very co-dependent, I know that.\" Has taken SSRI and buspirone in past for anxiety/depression. She states she didn't take them very long, and isn't sure what depression/anxiety are like at baseline for her. Family, especially sister is concerned, and helped patient look at treatment programs, and called [facility] intake a number of times until bed was available. Patient denies other illness, no recent injury. Current withdrawal complaints are none, as last reported opioid was over 24 hours prior to this exam, and she received initial buprenorphine doses yesterday and this morning. She is ambivalent about maintenance buprenorphine, but does want residential treatment and is open to recommendations. She is admitted voluntarily.            Past Medical History:   I have reviewed this patient's past medical history, see HPI.         Past Surgical History:   I have reviewed PSH, and patient denies.         Social History:   I have reviewed this patient's social history, see HPI.          Family History:   I have reviewed this patient's family history, and it is not directly pertinent to the present encounter. Patient does indicate that mother has likely alcohol use disorder which is stressor.         Immunizations:     There is no immunization history on file for this patient.          Allergies:   All allergies reviewed and addressed  No Known Allergies          Medications:     Prescriptions Prior to Admission   Medication Sig Dispense Refill Last Dose     naproxen (NAPROSYN) 500 MG tablet Take 500 mg by " mouth as needed for moderate pain   4/26/2017      [place]  query result:  Date Dispensed/  Date Prescribed Drug Name/  NDC Qty. Dispensed/  Days Supply Refill #/  Authorized Refills RX # Prescriber Dispenser Recipient MED Daily  03/16/2017 03/16/2017 OXYCODONE HCL 20 MG TABLET  [id number] 120  30 0  0 [id number] UJAN JOSÉ ALENA CHON ARTIS  [id number] Mc4 [facility]  1985  [address] [place] [address] 120  02/09/2017 02/09/2017 OXYCODONE HCL 20 MG TABLET  [id number] 120  30 0  0 [id number] [place]  [id number] Mc4 [facility]  1985  [address] [place] [address] 120  01/05/2017 01/05/2017 OXYCODONE HCL 20 MG TABLET  [id number] 120  20 0  0 [id number] [place], [place]  [id number] Mc4 [facility]  1985  [address] [place] [address] 180  12/08/2016 12/08/2016 OXYCODONE HCL 20 MG TABLET  [id number] 120  30 0  0 [id number] [place]  [id number] Mc4 [facility]  1985  [address] [place] [address] 120  11/11/2016  11/10/2016 OXYCODONE HCL 20 MG TABLET  [id number] 120  30 0  0 [id number] [place]  [id number] Mc4 [facility]  1985  [address] [place] [address] 120  10/13/2016  10/13/2016 OXYCODONE HCL 20 MG TABLET  [id number] 120  30 0  0 [id number] [place]  [id number] [facility]  1985  [address] [place] [address] 120  09/14/2016 07/25/2016 OXYCODONE HCL 20 MG TABLET  [id number] 120  30 0  0 [id number] IZZY PEREA  [place], [place]  [id number] [employer] [facility]  1985  [address] [place]  [address] 120  08/17/2016 08/16/2016 OXYCODONE HCL 20 MG TABLET  [id number] 136  34 0  0 [id number] [place]  [id number] [facility]  1985  [address] [place] [address] 120  07/25/2016 07/25/2016 OXYCODONE HCL 15 MG TABLET  [id number] 150  25 0  0 [id number] [place]  [id number] [facility]  1985  [address] [place] [address] 135  07/18/2016 07/18/2016 OXYCODONE HCL 20 MG TABLET  [id number] 165  27 0  0 [id number] KIMBERLY LUCIO)  [place], [place]  [id number] [facility]  1985  [address] [place] [address] 183.33  07/02/2016 07/01/2016 OXYCODONE HCL 15 MG TABLET  [id number] 30  5 0  0 [id number] KATE ASCENCIO [place]  [id number] [facility]  1985  [address] [place] [address] 135  06/22/2016 06/22/2016 OXYCODONE HCL 20 MG TABLET  [id number] 165  27 0  0 [id number] CHON MOTTA  [id number] [facility]  1985  [address] [place] [address] 183.33  05/26/2016 05/26/2016 OXYCODONE HCL 20 MG TABLET  [id number] 165  28 0  0 [id number] [place]  [id number] [facility]  [address]  [address] [place] [address] 176.79  05/01/2016 03/30/2016 OXYCODONE HCL 20 MG TABLET  [id number] 165  28 0  0 [id number] [place]  [id number] [employer] [facility] [place] KRISTA NI  1985  [address] [place], [place]         Review of Systems:   Complete ROS performed and is negative except as noted in JULIAN, and elsewhere in this note.           Physical Exam:     Vitals were reviewed  Patient Vitals for the past 12 hrs:   BP Temp Temp src Pulse  "Resp   05/01/17 0805 114/73 97.2  F (36.2  C) Oral 60 16   04/30/17 2016 114/78 97.9  F (36.6  C) Oral 85 16     Constitutional:   Alert, non-toxic, no distress     Eyes:   Anicteric, mild injection, PERRL     ENT:   No rhinorrhea, mmm     Lungs:   No increased work of breathing and clear to auscultation     Cardiovascular:   Well-perfused, no murmur, no edema     Abdomen:   Non-distended, active bowel sounds, soft, NT, no HSM     Neurologic:   No tremor, normal tone, gait and coordination intact     Back:  Spine straight, no deformity, no tenderness, full ROM    Skin:   Clear, no flushing, no jaundice     MENTAL STATUS EXAM  Appearance: in scrubs, fair grooming  Attitude: engageable, cooperative  Behavior: no agitation or slowing  Eye Contact: focused on examiner, appropriate  Speech: coherent, no pressuring  Orientation: oriented to person , place, time and situation  Mood: \"okay\"  Affect: tired, subdued, normal reactivity  Thought Process: linear, goal-directed, no FOI or NEDRA  Suicidal Ideation: denies thought/intent/plan  Hallucination: denies  Insight: partial  Judgment: adequate for safety          Data:   All laboratory data reviewed  Results for orders placed or performed during the hospital encounter of 04/30/17   Drug abuse screen 6 urine (chem dep)   Result Value Ref Range    Amphetamine Qual Urine (A) NEG     Positive   Cutoff for a positive amphetamine is greater than 500 ng/mL. This is an   unconfirmed screening result to be used for medical purposes only.      Barbiturates Qual Urine  NEG     Negative   Cutoff for a negative barbiturate is 200 ng/mL or less.      Benzodiazepine Qual Urine  NEG     Negative   Cutoff for a negative benzodiazepine is 200 ng/mL or less.      Cannabinoids Qual Urine  NEG     Negative   Cutoff for a negative cannabinoid is 50 ng/mL or less.      Cocaine Qual Urine  NEG     Negative   Cutoff for a negative cocaine is 300 ng/mL or less.      Ethanol Qual Urine  NEG     " Negative   Cutoff for a negative urine ethanol is 0.05 g/dL or less      Opiates Qualitative Urine (A) NEG     Positive   Cutoff for a positive opiate is greater than 300 ng/mL. This is an unconfirmed   screening result to be used for medical purposes only.     HCG qualitative urine   Result Value Ref Range    HCG Qual Urine Negative NEG   CBC with platelets   Result Value Ref Range    WBC 8.1 4.0 - 11.0 10e9/L    RBC Count 4.60 3.8 - 5.2 10e12/L    Hemoglobin 13.8 11.7 - 15.7 g/dL    Hematocrit 41.8 35.0 - 47.0 %    MCV 91 78 - 100 fl    MCH 30.0 26.5 - 33.0 pg    MCHC 33.0 31.5 - 36.5 g/dL    RDW 13.3 10.0 - 15.0 %    Platelet Count 212 150 - 450 10e9/L   Comprehensive metabolic panel   Result Value Ref Range    Sodium 146 (H) 133 - 144 mmol/L    Potassium 4.0 3.4 - 5.3 mmol/L    Chloride 112 (H) 94 - 109 mmol/L    Carbon Dioxide 28 20 - 32 mmol/L    Anion Gap 6 3 - 14 mmol/L    Glucose 96 70 - 99 mg/dL    Urea Nitrogen 7 7 - 30 mg/dL    Creatinine 0.92 0.52 - 1.04 mg/dL    GFR Estimate 71 >60 mL/min/1.7m2    GFR Estimate If Black 85 >60 mL/min/1.7m2    Calcium 8.4 (L) 8.5 - 10.1 mg/dL    Bilirubin Total 0.7 0.2 - 1.3 mg/dL    Albumin 3.3 (L) 3.4 - 5.0 g/dL    Protein Total 6.1 (L) 6.8 - 8.8 g/dL    Alkaline Phosphatase 42 40 - 150 U/L    ALT 11 0 - 50 U/L    AST 8 0 - 45 U/L    Corrected calcium is normal. Mild Na elevation probably reflects dehydration.    Blood-borne pathogen testing is pending.     Attestation:  I have reviewed today's vital signs, notes, medications, and labs/studies pertinent to this encounter.    Indication for hospitalization is severe opioid use disorder with physical dependence and withdrawal; high degree of complexity due to potential withdrawal morbidity, complicated by chronic nonmalignant pain state.    Deepali Sahu MD   Pediatrics/Addiction Medicine

## 2017-05-01 NOTE — TELEPHONE ENCOUNTER
----- Message from CHAY Trejo sent at 5/1/2017  2:53 PM CDT -----  Regarding: Add Pt to LP Wait List  Please add the above named pt to the LP Wait List.    Pt will be transferring from [facility] Detox when the next available female bed is open and would need to be placed on the LP Wait List with 3A priority status.    Pt has [employer] insurance effective 5/1/17.    Pt will be going to the Women's or [facility] group [facility].    Thanks,    Flaco

## 2017-05-01 NOTE — PROGRESS NOTES
"CLINICAL NUTRITION SERVICES - ASSESSMENT NOTE     Nutrition Prescription    RECOMMENDATIONS FOR MDs/PROVIDERS TO ORDER:  None at this time    Malnutrition Status:    Patient does not meet two of the criteria necessary for diagnosing malnutrition    Recommendations already ordered by Registered Dietitian (RD):  None at this time    Future/Additional Recommendations:  No nutrition interventions warranted at this time. Please consult RD if further nutrition needs arise prior to discharge.       REASON FOR ASSESSMENT  Mecca Thorne is a/an 31 year old female assessed by the dietitian for Admission Nutrition Risk Screen for reduced oral intake over the last month    NUTRITION HISTORY  Patient reports eating about 1-2 meals per day over the last year due to \"drugs.\" No changes in weight reported.    CURRENT NUTRITION ORDERS  Diet: Regular  Intake/Tolerance: Eating well here and believes she will continue to eat well while not using substances. No nutrition concerns reported.    LABS  Labs reviewed    MEDICATIONS  Medications reviewed    ANTHROPOMETRICS  Height: 182.9 cm (6' 0\")  Most Recent Weight: 74.1 kg (163 lb 6.4 oz)    IBW: 81 kg (178 lb)  BMI: Normal BMI  Weight History: No documented weights available - patient does not report any recent changes in weight.    Wt Readings from Last 10 Encounters:   04/30/17 74.1 kg (163 lb 6.4 oz)     Dosing Weight: 74 kg (most recent weight)    ASSESSED NUTRITION NEEDS  Estimated Energy Needs: 4084-2533 kcals/day (25 - 30 kcals/kg)  Justification: Maintenance  Estimated Protein Needs: 59-74 grams protein/day (0.8 - 1 grams of pro/kg)  Justification: Maintenance  Estimated Fluid Needs: (1 mL/kcal)   Justification: Maintenance    PHYSICAL FINDINGS  See malnutrition section below.    MALNUTRITION  % Intake: </=50% for >/= 1 month (severe)  % Weight Loss: None noted  Subcutaneous Fat Loss: None observed  Muscle Loss: None observed  Fluid Accumulation/Edema: None noted  Malnutrition " Diagnosis: Patient does not meet two of the above criteria necessary for diagnosing malnutrition    NUTRITION DIAGNOSIS  No nutrition diagnosis at this time as patient eating well here while not using substances, no weight loss has been documented or reported and patient with no nutrition concerns.    INTERVENTIONS  Implementation  Nutrition Education: No education needs assessed at this time   Discussed nutrition history and PO intake since admission    Goals  None at this time.     Monitoring/Evaluation  No nutrition follow-up warranted at this time.  Please consult if further needs arise.    Amrita Wheat RD, LD  [phone]

## 2017-05-01 NOTE — PROGRESS NOTES
SPIRITUAL HEALTH SERVICES  SPIRITUAL ASSESSMENT Progress Note  [facility] ([facility]) 3AW   ON-CALL VISIT    Visit due to request for  on admission.  Mecca did not recall asking to see a  and declined a visit at this time.    I will notify the unit  of care provided.    Roxanne Mendez MDiv  Chaplain Resident  Pager [phone]
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
## 2017-05-02 ENCOUNTER — HOSPITAL ENCOUNTER (OUTPATIENT)
Dept: BEHAVIORAL HEALTH | Facility: CLINIC | Age: 32
End: 2017-05-02
Attending: PSYCHIATRY & NEUROLOGY
Payer: COMMERCIAL

## 2017-05-02 VITALS
HEIGHT: 72 IN | RESPIRATION RATE: 16 BRPM | BODY MASS INDEX: 22.13 KG/M2 | TEMPERATURE: 98.9 F | HEART RATE: 78 BPM | WEIGHT: 163.4 LBS | OXYGEN SATURATION: 100 % | SYSTOLIC BLOOD PRESSURE: 113 MMHG | DIASTOLIC BLOOD PRESSURE: 69 MMHG

## 2017-05-02 PROCEDURE — 25000132 ZZH RX MED GY IP 250 OP 250 PS 637: Performed by: PSYCHIATRY & NEUROLOGY

## 2017-05-02 PROCEDURE — 99231 SBSQ HOSP IP/OBS SF/LOW 25: CPT | Performed by: PSYCHIATRY & NEUROLOGY

## 2017-05-02 PROCEDURE — 86803 HEPATITIS C AB TEST: CPT | Performed by: PSYCHIATRY & NEUROLOGY

## 2017-05-02 PROCEDURE — 36415 COLL VENOUS BLD VENIPUNCTURE: CPT | Performed by: PSYCHIATRY & NEUROLOGY

## 2017-05-02 PROCEDURE — 87340 HEPATITIS B SURFACE AG IA: CPT | Performed by: PSYCHIATRY & NEUROLOGY

## 2017-05-02 PROCEDURE — 86704 HEP B CORE ANTIBODY TOTAL: CPT | Performed by: PSYCHIATRY & NEUROLOGY

## 2017-05-02 RX ORDER — ACETAMINOPHEN 325 MG/1
325-650 TABLET ORAL EVERY 4 HOURS PRN
Qty: 100 TABLET | Refills: 0 | Status: SHIPPED | OUTPATIENT
Start: 2017-05-02

## 2017-05-02 RX ORDER — GABAPENTIN 300 MG/1
300 CAPSULE ORAL 3 TIMES DAILY PRN
Status: DISCONTINUED | OUTPATIENT
Start: 2017-05-02 | End: 2017-05-02 | Stop reason: HOSPADM

## 2017-05-02 RX ORDER — TRAZODONE HYDROCHLORIDE 50 MG/1
50 TABLET, FILM COATED ORAL
Qty: 30 TABLET | Refills: 0 | Status: SHIPPED | OUTPATIENT
Start: 2017-05-02 | End: 2017-05-22

## 2017-05-02 RX ORDER — NAPROXEN 500 MG/1
500 TABLET ORAL 2 TIMES DAILY PRN
Qty: 60 TABLET | Refills: 0 | Status: SHIPPED | OUTPATIENT
Start: 2017-05-02

## 2017-05-02 RX ORDER — LORATADINE 10 MG/1
10 TABLET ORAL DAILY PRN
COMMUNITY
End: 2017-05-28

## 2017-05-02 RX ORDER — BUPRENORPHINE 2 MG/1
4 TABLET SUBLINGUAL
Status: DISCONTINUED | OUTPATIENT
Start: 2017-05-03 | End: 2017-05-02 | Stop reason: HOSPADM

## 2017-05-02 RX ORDER — AMOXICILLIN 250 MG
2 CAPSULE ORAL DAILY PRN
COMMUNITY
End: 2017-05-28

## 2017-05-02 RX ORDER — HYDROXYZINE HYDROCHLORIDE 25 MG/1
25-50 TABLET, FILM COATED ORAL EVERY 6 HOURS PRN
Qty: 120 TABLET | Refills: 0 | Status: SHIPPED | OUTPATIENT
Start: 2017-05-02

## 2017-05-02 RX ORDER — BUPRENORPHINE 2 MG/1
6 TABLET SUBLINGUAL ONCE
Status: COMPLETED | OUTPATIENT
Start: 2017-05-02 | End: 2017-05-02

## 2017-05-02 RX ORDER — GABAPENTIN 300 MG/1
300 CAPSULE ORAL 3 TIMES DAILY PRN
Qty: 90 CAPSULE | Refills: 0 | Status: SHIPPED | OUTPATIENT
Start: 2017-05-02 | End: 2017-05-30

## 2017-05-02 RX ORDER — TIZANIDINE 2 MG/1
2 TABLET ORAL EVERY 8 HOURS PRN
Qty: 12 TABLET | Refills: 0 | Status: SHIPPED | OUTPATIENT
Start: 2017-05-02 | End: 2017-05-06

## 2017-05-02 RX ORDER — MAGNESIUM HYDROXIDE/ALUMINUM HYDROXICE/SIMETHICONE 120; 1200; 1200 MG/30ML; MG/30ML; MG/30ML
30 SUSPENSION ORAL EVERY 6 HOURS PRN
COMMUNITY
End: 2017-05-28

## 2017-05-02 RX ORDER — TIZANIDINE 2 MG/1
2 TABLET ORAL EVERY 8 HOURS PRN
Status: DISCONTINUED | OUTPATIENT
Start: 2017-05-02 | End: 2017-05-02 | Stop reason: HOSPADM

## 2017-05-02 RX ADMIN — BUPRENORPHINE HCL 6 MG: 2 TABLET SUBLINGUAL at 08:25

## 2017-05-02 RX ADMIN — NICOTINE POLACRILEX 8 MG: 4 GUM, CHEWING ORAL at 12:24

## 2017-05-02 RX ADMIN — HYDROXYZINE HYDROCHLORIDE 50 MG: 25 TABLET ORAL at 08:28

## 2017-05-02 RX ADMIN — NICOTINE POLACRILEX 8 MG: 4 GUM, CHEWING ORAL at 08:25

## 2017-05-02 ASSESSMENT — ACTIVITIES OF DAILY LIVING (ADL)
DRESS: INDEPENDENT
ORAL_HYGIENE: INDEPENDENT
ORAL_HYGIENE: INDEPENDENT
DRESS: INDEPENDENT
GROOMING: INDEPENDENT
GROOMING: INDEPENDENT
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
## 2017-05-02 NOTE — PROGRESS NOTES
[facility] Nursing Health Assessment    Patient Name:  Mecca Thorne  Date of review:  5/2/2017  Vital signs: per 3a    Transfer from 3a    Counselor: Ron  Drug of Choice: Heroin  Last use: 4/29/2017  Home clinic/MD: Dr. Marx,[facility]  Psychiatrist/therapist: None, requests psych consult    Medical history/current conditions:  Arthritis, hx of herniated disc in lower back    Mental Health diagnosis: depression and anxiety  Medication compliant?: yes  Recent sucidal thoughts? no     When? n/a  Current thought of self-harm? no    Plan? n/a  Pt. Self rating of impulsiveness? (1-10 scale): 5    Pain assessment:   Pt. Experiencing pain at this time? No  Rating on 0-10 scale: (1-10 scale): 0  Location: n/a  n/a  Result of: n/a  L P pain management strategy: otc  On-going nursing intervention required?   No

## 2017-05-02 NOTE — PROGRESS NOTES
Diet & BMI Assessment     Are you on a special diet?    No   Do you have any concerns regarding your nutritional status?   No   Have you had any appetite changes in the last 3 months?       No   Have you had any weight loss or weight gain in the last 3 months?    If yes, how much weight gain or loss:    If weight patient gains or loses is more than 10 pounds, refer to primary care provider for assessment.        No   Was the patient informed of BMI?    Normal, No Intervention   Yes

## 2017-05-02 NOTE — PROGRESS NOTES
Initial Services Plan        Before your first treatment group, please do the following    Immediate health & safety concerns:  Go to the emergency room if you start to have withdrawal symptoms.  Look for a support network (such as AA, NA, DBT group, a Faith group, etc.)    Suggestions for client during the time between intake & completion of treatment plan:  Tour your treatment center (unit or outpatient clinic).  Introduce yourself to the treatment group.  Spend time getting to know your peers.  Complete the problem list for your treatment plan.  Review your patient or client handbook.    Client issues to be addressed in the first treatment sessions:  Talk to your family and friends about the family program.  Other: first time in treatment, nervous about withdraw/has vivitrol appt with Dr. Byrne 5/10/2017      Caroline Anthony RN  5/2/2017  1:59 PM

## 2017-05-02 NOTE — PROGRESS NOTES
Name: Mecca Thorne  Date: 5/2/2017  Medical Record: [id number]    Envelope Number: [id number]    List of Contents (List each item separately in new row):   Narcan 4MG/0.1ML LIQD    Admission:  I am responsible for any personal items that are not sent to the safe or pharmacy.  [facility] is not responsible for loss, theft or damage of any property in my possession.      Patient Signature:  ___________________________________________       Date/Time:__________________________    Staff Signature: __________________________________       Date/Time:__________________________    2nd Staff person, if patient is unable/unwilling to sign:      __________________________________________________________       Date/Time: __________________________      Discharge:  [facility] has returned all of my personal belongings:    Patient Signature: ________________________________________     Date/Time: ____________________________________    Staff Signature: ______________________________________     Date/Time:_____________________________________

## 2017-05-02 NOTE — TELEPHONE ENCOUNTER
[facility]  Placed in [facility] folder.  Enedina Bolivar

## 2017-05-02 NOTE — DISCHARGE INSTRUCTIONS
Behavioral [facility] Discharge Planning and Instructions  THANK YOU FOR CHOOSING THE [facility]  [facility]: [phone]    Summary: You were admitted to and processed through [facility] on April 30, 2017 for detoxification from opioids.  A medical examination was performed that included lab work. You have met with a  and opted to begin intensive outpatient treatment with lodging at [facility].  Please make your recovery a priority, Miss Thorne.    Recommendation:  Residential Treatment, psychotherapy, sober support group engagement and active work with a sponsor or  through [facility] (see below for contact information).    Main Diagnosis:  Opioid Dependence    Major Treatments, Procedures and Findings:  You were detoxified from opioids using the appropriate protocol(s). You have had a chemical dependency assessment.  You have had blood drawn, and the results have been reviewed with you.  Please take a copy of your laboratory work with you to your next provider appointment.    Symptoms to Report:  If you experience more anxiety, confusion, sleeplessness, deep sadness or thoughts of suicide, notify your treatment team or notify your primary care physician. IF THE SYMPTOMS YOU ARE EXPERIENCING ARE A MEDICAL EMERGENCY, CALL 911 IMMEDIATELY.     Lifestyle Adjustment: Adjust your lifestyle to get enough sleep, relaxation, exercise and excellent nutrition. Continue to develop healthy coping skills to decrease stress and promote a sober living environment. Do not use alcohol, illegal drugs or addictive medications other than what is currently prescribed. OSMIN CONNOR, and a sponsor are excellent resources for support.     Primary Provider: Dr. Marx of [facility])  Patient states that she will finalize appointment time once secured in [facility].     Resources:  [facility] [phone] Support Group:  NIKOLAS/OSMIN and  Sponsor/support.  Crisis Intervention: 338.227.2382 or 219-372-4096 (TTY: 464.291.9105). Call anytime for help.  National [place] on Mental Illness (www.[place].salima.org): 824.299.4075 or 299-333-3162.  Alcoholics Anonymous (www.alcoholics-anonymous.org): Check your phone book for your local chapter.  Suicide Awareness Voices of Education (www.save.org): 741-035-PRXK (5114)  National Suicide Prevention Line (www.mentalhealthmn.org): 474-882-LSRQ (5859)  Mental Health Consumer/Survivor Network of [place] (www.mhcsn.net): 555.445.9650 or 646-735-2596.  Mental Health Association of [place] (www.mentalhealth.org): 322.762.8331 or 046-782-8842  Substance Abuse and Mental Health Services. (www.samhsa.gov)    [place] Recovery Connection ([facility])  [facility] connects people seeking recovery to resources that help foster and sustain long-term recovery.  Whether you are seeking resources for treatment, transportation, housing, job training, education, health care or other pathways to recovery, [facility] is a great place to start. 238.987.8449 www.[place] [employer].org    General Medication Instruction: See your medication papers for instructions. Take all medicines as directed.  Make no changes unless your doctor suggests them. Go to all your doctor visits.  Be sure to have all your required lab tests. This way, your medicines may be refilled on time. Do not use any drugs not prescribed by your provider. AA/NA and sponsors are excellent resources for support. Avoid alcohol at all costs!    Please Note:  If you have any questions at anytime after you are discharged please call the [facility] [place] detoxification [facility] at [phone].  [facility] , Health, Behavioral Intake [phone]. Please take this discharge folder with you to all your follow up appointments, it contains your lab results, diagnosis, medication list and discharge recommendations. THANK YOU FOR CHOOSING THE [facility]  HEALTH

## 2017-05-02 NOTE — PROGRESS NOTES
Addiction Medicine Progress Note          Assessment and Plan:   Assessment:   Principal Problem:    Opioid dependence with withdrawal (H)  Active Problems:    Opioid use disorder, severe, dependence (H)    Chronic nonmalignant pain        Plan:   Buprenorphine taper, tried to complete today, but patient hyperfocused on future withdrawal, so will make prn 4 mg dose available tomorrow morning (if still on 3A). She is aware that if she goes to [facility] today that she will receive non-opioid medications only tomorrow and after  Dispo planning ongoing; see CM notes for details but so far plan is [facility] and injectable naltrexone with appointment with Dr. Byrne set for 5/10/2017         Interval History:   No acute events. No withdrawal complaints. Encouraged non-opioid protocol over continuing buprenorphine taper, given patient plan to receive injectable naltrexone but she declines. Had originally set last dose for today, but patient became anxious and preoccupied with future withdrawal and asked to discuss a second time with me. I reviewed the non-opioid regimen, and validated her fear of withdrawal, impending withdrawal, and cravings. We agreed to final 4 mg dose tomorrow if still on 3A. She was pleased to hear she has appointment with Dr. Byrne as this is former co-worker of her sister's who spoke positively and this was reassuring to patient. No back pain complaints. Had a twinge in lumbar area yesterday that felt better after naproxen.            Review of Systems:   Complete ROS performed and is negative except as noted in interval history, and elsewhere in this note.           Medications:     Current Facility-Administered Medications   Medication     naproxen (NAPROSYN) tablet 500 mg     hydrOXYzine (ATARAX) tablet 25-50 mg     acetaminophen (TYLENOL) tablet 650 mg     alum & mag hydroxide-simethicone (MYLANTA ES/MAALOX  ES) suspension 30 mL     magnesium hydroxide (MILK OF MAGNESIA) suspension 30 mL      "bisacodyl (DULCOLAX) Suppository 10 mg     traZODone (DESYREL) tablet 50 mg     buprenorphine (SUBUTEX) sublingual tablet 4 mg     naloxone (NARCAN) injection 0.1-0.4 mg     nicotine polacrilex (NICORETTE) gum 4-8 mg          Physical Exam:     Vitals were reviewed  Patient Vitals for the past 12 hrs:   BP Temp Temp src Pulse Resp   05/01/17 2005 107/51 98.3  F (36.8  C) Tympanic 59 16     Constitutional:   Alert, non-toxic, no physical distress     Eyes:   Anicteric, non-injected, PERRL     ENT:   No rhinorrhea, mmm     Lungs:   no increased work of breathing and clear to auscultation     Cardiovascular:   Well-perfused, no murmur, no edema     Neurologic:   No tremor, normal tone, gait and coordination intact     Skin:   Clear, no flushing, no jaundice     MENTAL STATUS EXAM  Appearance: in scrubs, fair grooming  Attitude: engageable, cooperative  Behavior: no agitation or slowing  Eye Contact: normal  Speech: coherent, no pressuring  Orientation: oriented to person , place, time and situation  Mood:  \"nervous (about withdrawing)\"  Affect: anxious, otherwise bright, full range  Thought Process: linear, goal-directed, no FOI or NEDRA  Suicidal Ideation: denies thought/intent/plan  Hallucination: denies  Insight: partial/limited, capable of improvement  Judgement: adequate for safety          Data:   All laboratory data reviewed, blood-borne pathogen testing results pending.    Attestation:  I have reviewed today's vital signs, notes, medications, and labs/studies pertinent to this encounter.     Deepali Sahu MD   Pediatrics/Addiction Medicine    "

## 2017-05-03 ENCOUNTER — HOSPITAL ENCOUNTER (OUTPATIENT)
Dept: BEHAVIORAL HEALTH | Facility: CLINIC | Age: 32
End: 2017-05-03
Attending: PSYCHIATRY & NEUROLOGY
Payer: COMMERCIAL

## 2017-05-03 PROBLEM — F19.20 CHEMICAL DEPENDENCY (H): Status: ACTIVE | Noted: 2017-05-03

## 2017-05-03 LAB
HBV CORE AB SERPL QL IA: NONREACTIVE
HBV SURFACE AG SERPL QL IA: NONREACTIVE
HCV AB SERPL QL IA: NORMAL

## 2017-05-03 PROCEDURE — 10020000 ZZH LODGING PLUS FACILITY CHARGE ADULT

## 2017-05-03 PROCEDURE — H2035 A/D TX PROGRAM, PER HOUR: HCPCS | Mod: HQ

## 2017-05-03 NOTE — PROGRESS NOTES
ADMIT/INTRO:  Pt attended first group this am and briefly shared what brought her to tx. She shared she has been using heroin for a year and it is time to quit. This is pt's first time in tx. Group guidelines reviewed and pt welcomed by peers and staff. Pt was given an NA book and goals sheet. Will meet with pt when she has it completed. Pt seems open and willing to be in tx. Pt complete paperwork and meet with counselor.

## 2017-05-04 ENCOUNTER — HOSPITAL ENCOUNTER (OUTPATIENT)
Dept: BEHAVIORAL HEALTH | Facility: CLINIC | Age: 32
End: 2017-05-04
Attending: PSYCHIATRY & NEUROLOGY
Payer: COMMERCIAL

## 2017-05-04 PROCEDURE — 10020000 ZZH LODGING PLUS FACILITY CHARGE ADULT

## 2017-05-04 PROCEDURE — 90792 PSYCH DIAG EVAL W/MED SRVCS: CPT | Performed by: PSYCHIATRY & NEUROLOGY

## 2017-05-04 PROCEDURE — H2035 A/D TX PROGRAM, PER HOUR: HCPCS | Mod: HQ

## 2017-05-04 NOTE — H&P
IDENTIFYING INFORMATION:  Mecca Thorne is a 31-year-old  female, engaged, has 2 children.  She is presently unemployed.  She [place] her fiance and 2 kids [place].  She complains of back pain.  The 2 kids are under the care of her significant fiance's mother.      CHIEF COMPLAINT:  Back herniated disk.      HISTORY OF PRESENT ILLNESS:  The patient states that she was prescribed pain pills for herniated disk and she would abuse them and then buy them on the streets.  One year ago she switched to heroin because that was cheap.  She and her fiance use.  Their use led to frequent arguments .This  Feb he asked her to leave.  That was when she decided that she needs to get help and brought herself here.  She was detoxed by Dr. Cazares and when she was detoxed the plan was for her to be on naltrexone shot.  Her sister works for [employer] and that is what was recommend a t [employer] , so pt was willing to get it.  She has tolerance to it, withdrawal from it, progressive use with loss of control, used despite having negative consequences, work, money, relationships.  She has tried to quit but was unsuccessful.  She uses occasionally cocaine, Adderall and meth.  Does not use any alcohol.  She smokes half a pack a day.  She is not interested in quitting.        In 2007, she was not using pills and had depression.  She has postpartum depression.  She was very unmotivated.  She was put on Prozac, took it for a while and then stopped.  When she gets depressed, she does not eat, sleeps too much, energy, motivation, interest suffer.  She does not have any suicidal or homicidal ideation, denied auditory or visual hallucinations.  She does have anxiety.  She has anxiety over everyday things.  She worries excessively, not able to stop worrying.  This worry impacts her sleep, impacts her concentration, makes her irritable and has muscle tension.      PAST PSYCHIATRIC HISTORY:  Never psychiatrically  hospitalized, never had any chemical dependency treatments.      PAST MEDICAL HISTORY:  She has low back pain.      REVIEW OF SYSTEMS:  Ten-point review of systems reviewed is negative.      FAMILY HISTORY:  Mother has alcoholism.  Dad has depression.      SOCIAL HISTORY:  Born in [place], good childhood, no abuse.  She has 1 older sister.  She did a semester of college.  Single, is engaged.  Two kids.  Support system consists of family and motivation.  Stressors include finances and housing.      MENTAL STATUS EXAMINATION:  The patient is a 31-year-old  female who stated age.  She has adequate grooming, adequate hygiene, maintains good eye contact, cooperative.  No psychomotor abnormalities.  No gait problems.  Mood is anxious.  Affect is congruent.  Speech is spontaneous, normal rate.  Does not have any suicidal or homicidal ideation, denied auditory hallucinations.  Alert and oriented x3.  Recent and remote memory, language, fund of knowledge are all adequate.  No loose associations.  The patient does not have any suicidal or homicidal ideation, plan or intent.      DIAGNOSES:   Axis I:     1.  Major depressive disorder, recurrent without psychotic features.   2.  Generalized anxiety disorder.   3.  Opiate dependence.   4.  Nicotine dependence.      PLAN:  The patient will be started on Cymbalta 20 mg twice a day.  The patient tried Prozac and Cymbalta to help with pain also.  Patient has been abusing meth, cocaine, but wants to be on Adderall but she needs to be completely sober before considering that. .  The patient will return to me in 1 week or earlier if there is a problem.  Her ZBIGNIEW-7 score is 27.  PHQ-9 score is 13.         GREGORY FITCH MD             D: 2017 12:36   T: 2017 14:00   MT: NATHAN      Name:     KRISTA NI   MRN:      [id number]        Account:      [id number]   :      1985           Admitted:     [id number]      Document: [id number]
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
## 2017-05-05 ENCOUNTER — HOSPITAL ENCOUNTER (OUTPATIENT)
Dept: BEHAVIORAL HEALTH | Facility: CLINIC | Age: 32
End: 2017-05-05
Attending: PSYCHIATRY & NEUROLOGY
Payer: COMMERCIAL

## 2017-05-05 PROCEDURE — H2035 A/D TX PROGRAM, PER HOUR: HCPCS | Mod: HQ

## 2017-05-05 PROCEDURE — 10020000 ZZH LODGING PLUS FACILITY CHARGE ADULT
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
## 2017-05-06 ENCOUNTER — HOSPITAL ENCOUNTER (OUTPATIENT)
Dept: BEHAVIORAL HEALTH | Facility: CLINIC | Age: 32
End: 2017-05-06
Attending: PSYCHIATRY & NEUROLOGY
Payer: COMMERCIAL

## 2017-05-06 PROCEDURE — 10020000 ZZH LODGING PLUS FACILITY CHARGE ADULT

## 2017-05-06 PROCEDURE — H2035 A/D TX PROGRAM, PER HOUR: HCPCS | Mod: HQ

## 2017-05-07 ENCOUNTER — HOSPITAL ENCOUNTER (OUTPATIENT)
Dept: BEHAVIORAL HEALTH | Facility: CLINIC | Age: 32
End: 2017-05-07
Attending: PSYCHIATRY & NEUROLOGY
Payer: COMMERCIAL

## 2017-05-07 PROCEDURE — 10020000 ZZH LODGING PLUS FACILITY CHARGE ADULT

## 2017-05-07 PROCEDURE — H2035 A/D TX PROGRAM, PER HOUR: HCPCS | Mod: HQ

## 2017-05-08 ENCOUNTER — HOSPITAL ENCOUNTER (OUTPATIENT)
Dept: BEHAVIORAL HEALTH | Facility: CLINIC | Age: 32
End: 2017-05-08
Attending: PSYCHIATRY & NEUROLOGY
Payer: COMMERCIAL

## 2017-05-08 PROCEDURE — H2035 A/D TX PROGRAM, PER HOUR: HCPCS | Mod: HQ

## 2017-05-08 PROCEDURE — 10020000 ZZH LODGING PLUS FACILITY CHARGE ADULT

## 2017-05-08 NOTE — PROGRESS NOTES
Acknowledgement of Current Treatment Plan     1. I have reviewed my treatment plan with my therapist / counselor on 5/8/2017. I agree with the plan as it is written in the electronic health record.    Name Signature   Mecca Thorne      Name of Therapist / Counselor    CHAY Barney       2. I have completed and reviewed my Safety Plan with my counselor and signed this on 5/8/2017  I have been given the hard copy of this plan.    Patient signature:  ________________________________________________________________________    Signatures required for any additional Problems, Goals, and/or Interventions added to treatment plan:    I have been given a copy of the addition to my treatment plan in Dimension _____ on [date           ] and I agree with this as it is written in the electronic record.     Patient signature:   ________________________________________________________________________    I have been given a copy of the addition to my treatment plan in Dimension _____ on [date           ] and I agree with this as it is written in the electronic record.      Patient signature:   ________________________________________________________________________    I have been given a copy of the addition to my treatment plan in Dimension _____ on [date          ] and I agree with this as it is written in the electronic record.     Patient signature:   ________________________________________________________________________    I have been given a copy of the addition to my treatment plan in Dimension _____ on [date         ] and I agree with this as it is written in the electronic record.      Patient signature:   ________________________________________________________________________

## 2017-05-08 NOTE — DISCHARGE SUMMARY
Addiction Medicine Discharge Summary    Mecca Thorne MRN# [id number]   Age: 31 year old YOB: 1985     Date of Admission:  4/30/2017  Date of Discharge::  5/2/2017  1:54 PM  Admitting Physician:  Deepali Sahu MD  Discharge Physician:  Deepali Sahu MD     Home clinic: [facility] [place]  Primary care provider: KYLER Marx MD          Admission Diagnoses:   Opioid dependence with withdrawal (H) [F11.23]          Discharge Diagnosis:   Principal Problem:    Opioid dependence with withdrawal (H), completing buprenorphine taper  Active Problems:    Opioid use disorder, severe, dependence (H), will seek injectable naltrexone, although expressing ambivalence at discharge    Chronic nonmalignant pain, no acute issues in hospital but should have follow-up with PCP after discharge, with referrals as appropriate         Procedures:   All laboratory data reviewed  Results for orders placed or performed during the hospital encounter of 04/30/17   Drug abuse screen 6 urine (chem dep)   Result Value Ref Range    Amphetamine Qual Urine (A) NEG     Positive   Cutoff for a positive amphetamine is greater than 500 ng/mL. This is an   unconfirmed screening result to be used for medical purposes only.      Barbiturates Qual Urine  NEG     Negative   Cutoff for a negative barbiturate is 200 ng/mL or less.      Benzodiazepine Qual Urine  NEG     Negative   Cutoff for a negative benzodiazepine is 200 ng/mL or less.      Cannabinoids Qual Urine  NEG     Negative   Cutoff for a negative cannabinoid is 50 ng/mL or less.      Cocaine Qual Urine  NEG     Negative   Cutoff for a negative cocaine is 300 ng/mL or less.      Ethanol Qual Urine  NEG     Negative   Cutoff for a negative urine ethanol is 0.05 g/dL or less      Opiates Qualitative Urine (A) NEG     Positive   Cutoff for a positive opiate is greater than 300 ng/mL. This is an unconfirmed   screening result to be  used for medical purposes only.     HCG qualitative urine   Result Value Ref Range    HCG Qual Urine Negative NEG   CBC with platelets   Result Value Ref Range    WBC 8.1 4.0 - 11.0 10e9/L    RBC Count 4.60 3.8 - 5.2 10e12/L    Hemoglobin 13.8 11.7 - 15.7 g/dL    Hematocrit 41.8 35.0 - 47.0 %    MCV 91 78 - 100 fl    MCH 30.0 26.5 - 33.0 pg    MCHC 33.0 31.5 - 36.5 g/dL    RDW 13.3 10.0 - 15.0 %    Platelet Count 212 150 - 450 10e9/L   Comprehensive metabolic panel   Result Value Ref Range    Sodium 146 (H) 133 - 144 mmol/L    Potassium 4.0 3.4 - 5.3 mmol/L    Chloride 112 (H) 94 - 109 mmol/L    Carbon Dioxide 28 20 - 32 mmol/L    Anion Gap 6 3 - 14 mmol/L    Glucose 96 70 - 99 mg/dL    Urea Nitrogen 7 7 - 30 mg/dL    Creatinine 0.92 0.52 - 1.04 mg/dL    GFR Estimate 71 >60 mL/min/1.7m2    GFR Estimate If Black 85 >60 mL/min/1.7m2    Calcium 8.4 (L) 8.5 - 10.1 mg/dL    Bilirubin Total 0.7 0.2 - 1.3 mg/dL    Albumin 3.3 (L) 3.4 - 5.0 g/dL    Protein Total 6.1 (L) 6.8 - 8.8 g/dL    Alkaline Phosphatase 42 40 - 150 U/L    ALT 11 0 - 50 U/L    AST 8 0 - 45 U/L           Medications Prior to Admission:     Prescriptions Prior to Admission   Medication Sig Dispense Refill Last Dose     naproxen (NAPROSYN) 500 MG tablet Take 500 mg by mouth as needed for moderate pain     4/26/2017       [place]  query result:  Date Dispensed/  Date Prescribed Drug Name/  NDC Qty. Dispensed/  Days Supply Refill #/  Authorized Refills RX # Prescriber Dispenser Recipient MED Daily  03/16/2017 03/16/2017 OXYCODONE HCL 20 MG TABLET  [id number] 120  30 0  0 [id number] [facility] CHON GRIMES  [id number] [place], [place] Mecca Thorne  1985  [address] [place] [address] 120  02/09/2017 02/09/2017 OXYCODONE HCL 20 MG TABLET  [id number] 120  30 0  0 [id number] CHON OSCAR MD  [id number] [facility] [place]note 2[facility] [employer]  [place], [place] Mecca Thorne  1985  [address] [place] [address] 120  01/05/2017 01/05/2017 OXYCODONE HCL 20 MG TABLET  [id number] 120  20 0  0 [id number] IZZY PEREA  [place], [place]  [id number] [employer] [place], [place] [facility]  1985  [address] [place] [address] 180  12/08/2016 12/08/2016 OXYCODONE HCL 20 MG TABLET  [id number] 120  30 0  0 [id number] [place], [place]  [id number] [employer] [place], [place] [facility]  1985  [address] [place] [address] 120  11/11/2016  11/10/2016 OXYCODONE HCL 20 MG TABLET  [id number] 120  30 0  0 [id number] [place], [place]  [id number] [employer] [place], [place] [facility]  1985  [address] [place] [address] 120  10/13/2016  10/13/2016 OXYCODONE HCL 20 MG TABLET  [id number] 120  30 0  0 [id number] [place], [place]  [id number] [employer] [place], [place] [facility]  1985  [address] [place] [address] 120  09/14/2016 07/25/2016 OXYCODONE HCL 20 MG TABLET  [id number] 120  30 0  0 [id number] [place], [place]  [id number] [facility]  [place], [place] [facility]  1985  [address] [place] [address] 120  08/17/2016 08/16/2016 OXYCODONE HCL 20 MG TABLET  [id number] 136  34 0  0 [id number] [place], [place]  [id number] [employer] [place], [place] [facility]  1985  [address] [address] 120  07/25/2016 07/25/2016 OXYCODONE HCL 15 MG TABLET  [id number] 150  25 0  0 [id number] [place], [place]  [id number] [employer]  [place], [place] KRISTA NI  1985  [address] [address] 135  07/18/2016 07/18/2016 OXYCODONE HCL 20 MG TABLET  [id number] 165  27 0  0 [id number] KIMBERLY LUCIO)  [place], [place]  [id number] [employer]  [place], [place] LAST  [facility]  1985  [address] [place] [address] 183.33  07/02/2016 07/01/2016 OXYCODONE HCL 15 MG TABLET  [id number] 30  5 0  0 [id number] KATE ASCENCIO [place]  [id number] [facility] [employer] [place], [place] [facility]  1985  [address] [place] [address] 135  06/22/2016 06/22/2016 OXYCODONE HCL 20 MG TABLET  [id number] 165  27 0  0 [id number] SHAHAB BROWN [place]  [id number] [facility]  1985  [address] [place] [address] 183.33  05/26/2016 05/26/2016 OXYCODONE HCL 20 MG TABLET  [id number] 165  28 0  0 [id number] [place]  [id number] [facility]  1985  [address] [place] [address] 176.79  05/01/2016 03/30/2016 OXYCODONE HCL 20 MG TABLET  [id number] 165  28 0  0 [id number] [place], [place]  [id number] [facility]  1985  [address] [place], [place]          Discharge Medications:     Discharge Medication List as of 5/2/2017  1:12 PM      START taking these medications    Details   acetaminophen (TYLENOL) 325 MG tablet Take 1-2 tablets (325-650 mg) by mouth every 4 hours as needed for mild pain or fever, Disp-100 tablet, R-0, E-Prescribe      hydrOXYzine (ATARAX) 25 MG tablet Take 1-2 tablets (25-50 mg) by mouth every 6 hours as needed for anxiety, Disp-120 tablet, R-0, E-Prescribe      gabapentin (NEURONTIN) 300 MG capsule Take 1 capsule (300 mg) by mouth 3 times daily as needed (anxiety), Disp-90 capsule, R-0, E-Prescribe      traZODone (DESYREL) 50 MG tablet Take 1 tablet (50 mg) by mouth nightly as needed for sleep, Disp-30 tablet, R-0, E-Prescribe      naloxone (NARCAN) 1 mg/mL for intranasal kit (2 syringes with 2 mucosal atomizer device) For opioid overdose, spray one-half syringe contents into each nostril.  Repeat after 3 minutes if no or minimal  response., Disp-1 kit, R-1, E-PrescribeFor intranasal administration: Dispense 2 naloxone injection 2 mg/2 mL syringes.  Include 2 mucosal a tomization devices (MAD-Nasal).      tiZANidine (ZANAFLEX) 2 MG tablet Take 1 tablet (2 mg) by mouth every 8 hours as needed for other (opioid withdrawal symptoms), Disp-12 tablet, R-0, E-Prescribe         CONTINUE these medications which have CHANGED    Details   naproxen (NAPROSYN) 500 MG tablet Take 1 tablet (500 mg) by mouth 2 times daily as needed for moderate pain, Disp-60 tablet, R-0, E-Prescribe         STOP taking these medications       oxyCODONE HCl (ROXICODONE) 20 MG TABS immediate release tablet Comments:   Reason for Stopping:                  Consultations:   No consultations were requested during this admission          Brief History of Illness:   This patient is a 31 year old partnered (has fiance), unemployed female, mother of two minor children, with mixed prescription opioid/heroin use disorder, complicated by nonmalignant pain concerns, who presented to our ED seeking detoxification and treatment. She has many year history of opioid use problems, dating back at least to 2008 when she was receiving regular monthly oxycodone (OxyContin) and acetaminophen-oxycodone prescriptions, yet visiting EDs with pain complaints when the medication ran out. She has progressed over at least past year to heroin (smoked) mixed with prescribed opioids. Denies IV use. PCP had been filling opioid prescriptions until past few when she was referred to a pain program. Patient indicates many years of lumbar disc herniation and related pain. Oxycodone products, steroid injections, and some PT have been used. She endorses intermittent amphetamine (Adderall) use, but presently denies methamphetamine use. She lives with feliberto and their two children. He is actively using heroin, and patient is appropriately anxious about how to stay focused on her own treatment/self-care even if he  "does not. \"We have been together 17 years; we are very co-dependent, I know that.\" Has taken SSRI and buspirone in past for anxiety/depression. She states she didn't take them very long, and isn't sure what depression/anxiety are like at baseline for her. Family, especially sister is concerned, and helped patient look at treatment programs, and called [facility] intake a number of times until bed was available. Patient denies other illness, no recent injury. Current withdrawal complaints are none, as last reported opioid was over 24 hours prior to this exam, and she received initial buprenorphine doses yesterday and this morning. She is ambivalent about maintenance buprenorphine, but does want residential treatment and is open to recommendations. She is admitted voluntarily.          Hospital Course:   Patient decided to seek injectable naltrexone as maintenance medication, and appointemt with Dr. Byrne was obtained.   Encouraged non-opioid protocol over continuing buprenorphine taper, given patient plan to receive injectable naltrexone but she declined. Had originally set last dose for day of discharge, but patient became anxious and preoccupied with future withdrawal. I reviewed the non-opioid regimen, and validated her fear of withdrawal, impending withdrawal, and cravings. We agreed to final 4 mg dose but bed became available on LP. She was pleased to hear she has appointment with Dr. Byrne as this is former co-worker of her sister's who spoke positively and this was reassuring to patient. No back pain complaints. Had a twinge in lumbar area that felt better after naproxen. Nonetheless, nonmalignant pain will continue to serve as pain trigger. Had been on SSRI in past for depression/anxiety. It is recommended she see Psychiatrist when on LP.          Discharge Instructions and Follow-Up:   Discharge diet: Regular   Discharge activity: Activity as tolerated   Discharge follow-up: See AVS; has initial intake with Dr." Chavez on 5/10/2017 for assessment for injectable naltrexone           Discharge Disposition:   Discharged, with direct transport to [facility]      Attestation:  I have reviewed today's vital signs, notes, medications, and labs/studies pertinent to this admission.    Deepali Sahu MD   Pediatrics/Addiction Medicine

## 2017-05-09 ENCOUNTER — HOSPITAL ENCOUNTER (OUTPATIENT)
Dept: BEHAVIORAL HEALTH | Facility: CLINIC | Age: 32
End: 2017-05-09
Attending: PSYCHIATRY & NEUROLOGY
Payer: COMMERCIAL

## 2017-05-09 PROCEDURE — H2035 A/D TX PROGRAM, PER HOUR: HCPCS | Mod: HQ

## 2017-05-09 PROCEDURE — 10020000 ZZH LODGING PLUS FACILITY CHARGE ADULT

## 2017-05-09 NOTE — PROGRESS NOTES
Patient:  Mecca Thorne              Adult CD Progress Note and Treatment Plan Review     Attendance  Please refer to OP BEH CD Adult Attendance Record Documentation Flowsheet    Support group attended this week: yes    Reporting sobriety:  yes    Treatment Plan     Treatment Plan Review competed on:   05/09/17       Client preferred learning style: Visual  Hands on  Demonstration    Staff Members contributing      Makeda Mcintosh [facility],  Karthik Lamar [facility],   nurse, therapist Erin Marie, psychiatrist Dr Worley, Elodia Thompson [facility]                  Received Supervision: yes    Client: contributed to goals and plan.    Client received copy of plan/revised plan: Yes    Client agrees with plan/revised plan: Yes    Changes to Treatment Plan: No    New Goals added since last review  No    Goals worked on since last review   Continuing stabilization, medication management, relationships, shame, spirituality, grief/loss, aftercare planning    Strategies effective: yes    Strategies need these changes: NA    ASAM Risk Rating:    Dimension 1  0  Pt denies feeling any significant symptoms of withdrawal.  Pt has an appointment set with Dr Byrne, on 5/10/17, for a vivotrol injection.      Dimension 2  0  Pt presents a history of arthritis, chronic pain and a herniated disc.  She denies that these  medical issues that would interfere with programming.  She is able to attend groups/lectures and access medical care if needed.     Dimension 3 2  Pt has been diagnosed with major depressive disorder, recurrent, without psychotic features and generalized anxiety disorder per Dr Jayme Worley.  She is following her medication regiment as prescribed.  Pt reports feeling her mood is brighter and her stress level has decreased since entering treatment.  Pt reports feeling some stress around her relationships and financial issues.  An appointment has been set for Pt to meet with therapist Erin Marie.  Pt attended the  "grief/loss focus group to gain support and begin to mourn her losses, especially in relation to the death of her father. Pt's suicide rating this week resulted as \"no identified risk.\"  She denies any thoughts of suicide or self harm.       Dimension 4  1  Pt is attending programming and participating in exercises.  Pt reports she has been using with her significant partner and he is the father of her two children.  This may present as a barrier in Pt's recovery process.   Pt is interacting with peers and remains respectful of staff.    Dimension 5  4  Pt rated her cravings at a 2 on a scale of 1 to 10, ten being high.  It appears Pt is quite co-dependent on her significant partner.   Pt plans to attend relapse prevention workshops over the upcoming weekend to aid her in identifying major triggers/warning signs and develop coping skills.      Dimension 6  3  Pt attends AA meetings and plans to attend meetings in her home community.  Pt participated an exercise reflecting on her relationship with mother throughout her lifetime.  Pt reports finding this exercise beneficial in understanding how this relationship has impacted her. Pt verbalizes she is open to invite family members to attend the family week program.     Any changes in Vulnerable Adult Status?  No  If yes, add to treatment plan and individual abuse prevention plan.    Family Involvement:   None scheduled this week.      Data:   client did participate    Intervention:   Counselor feedback  Education  Emotional management  Group feedback  Relapse prevention  Client & counselor reviewed and signed ISP & assessment summary  Mental health education  Spirituality focus group with Bárbara Preciado,  attended by Karthik CARTWRIGHT    Assessment:   Stages of Change Model  contemplation  Preparation/Determination    Appears/Sounds  Cooperative   Motivated   Engaged      Plan:  Focus on recovery environment  Monitor emotional/physical health      Karthik Lamar, " LADC

## 2017-05-10 ENCOUNTER — TELEPHONE (OUTPATIENT)
Dept: PSYCHIATRY | Facility: CLINIC | Age: 32
End: 2017-05-10

## 2017-05-10 ENCOUNTER — TELEPHONE (OUTPATIENT)
Dept: ADDICTION MEDICINE | Facility: CLINIC | Age: 32
End: 2017-05-10

## 2017-05-10 ENCOUNTER — HOSPITAL ENCOUNTER (OUTPATIENT)
Dept: BEHAVIORAL HEALTH | Facility: CLINIC | Age: 32
End: 2017-05-10
Attending: PSYCHIATRY & NEUROLOGY
Payer: COMMERCIAL

## 2017-05-10 PROCEDURE — H2035 A/D TX PROGRAM, PER HOUR: HCPCS | Mod: HQ

## 2017-05-10 PROCEDURE — 10020000 ZZH LODGING PLUS FACILITY CHARGE ADULT

## 2017-05-10 NOTE — TELEPHONE ENCOUNTER
Incoming call from [facility] they need orders in Epic for pt to received the Vivitrol shot tomorrow.  Infusion ph. [phone]        Aguilar Zavala

## 2017-05-10 NOTE — TELEPHONE ENCOUNTER
TIZANIDINE 2 MG TAB      Last Written Prescription Date:  5/2/17  Last Fill Quantity: 12,   # refills: 0  Last Office Visit with [facility] prescribing provider: 05/08/17  Future Office visit:       Routing refill request to provider for review/approval because:  Drug not on the [facility] refill protocol or controlled substance  Drug not active on patient's medication list    Dina Castro  Pharmacy Technician  [facility]  [phone]

## 2017-05-11 ENCOUNTER — HOSPITAL ENCOUNTER (OUTPATIENT)
Dept: BEHAVIORAL HEALTH | Facility: CLINIC | Age: 32
End: 2017-05-11
Attending: PSYCHIATRY & NEUROLOGY
Payer: COMMERCIAL

## 2017-05-11 PROCEDURE — 10020000 ZZH LODGING PLUS FACILITY CHARGE ADULT

## 2017-05-11 PROCEDURE — 99214 OFFICE O/P EST MOD 30 MIN: CPT | Performed by: PSYCHIATRY & NEUROLOGY

## 2017-05-11 PROCEDURE — H2035 A/D TX PROGRAM, PER HOUR: HCPCS | Mod: HQ

## 2017-05-11 NOTE — PROGRESS NOTES
Interim history   This is a 31 year old female with 1. Major depressive disorder, recurrent without psychotic features.   2. Generalized anxiety disorder.   3. Opiate dependence.   4. Nicotine dependence. .Pt seen . Patient's mood is better   spirits are lifted feels more hopeful  Still anxious feel tense, worry , muscle tension  Energy Level:MODERATE  Sleep:poor   Appetite:low motivation interest are improving   Denied any Suicidal/homicidal ideation/plan intent. Denied psychosis    Tolerating meds and has no side effects.         No prior suicide attempts      No past medical history on file.    10 point ROS is negative   constitutional    HEENT - no symptoms   RESPIRATORY-no symptoms   CVS-no symptoms   GI-no symptoms  MUSCKULOSKELETAL -no symptoms  NEUROLOGICAL-no symptoms  ENDOCRINE-no symptoms  HEMATAOLOGY-no symptoms  SKIN-no symptoms  No family history on file.  Social History     Social History     Marital status: Single     Spouse name: N/A     Number of children: N/A     Years of education: N/A     Occupational History     Not on file.     Social History Main Topics     Smoking status: Current Every Day Smoker     Packs/day: 0.50     Years: 10.00     Smokeless tobacco: Never Used     Alcohol use Yes      Comment: occasional     Drug use: Yes      Comment: smokes heroin, coccaine     Sexual activity: Not on file     Other Topics Concern     Not on file     Social History Narrative     No narrative on file   FAMILY HISTORY: Mother has alcoholism. Dad has depression.       SOCIAL HISTORY: Born in [place], good childhood, no abuse. She has 1 older sister. She did a semester of college. Single, is engaged. Two kids. Support system consists of family and motivation. Stressors include finances and housing.          Medications:     Current Outpatient Prescriptions   Medication Sig     DULoxetine (CYMBALTA) 20 MG EC capsule Take 1 capsule (20 mg) by mouth 2 times daily     acetaminophen  (TYLENOL) 325 MG tablet Take 1-2 tablets (325-650 mg) by mouth every 4 hours as needed for mild pain or fever     naproxen (NAPROSYN) 500 MG tablet Take 1 tablet (500 mg) by mouth 2 times daily as needed for moderate pain     hydrOXYzine (ATARAX) 25 MG tablet Take 1-2 tablets (25-50 mg) by mouth every 6 hours as needed for anxiety     gabapentin (NEURONTIN) 300 MG capsule Take 1 capsule (300 mg) by mouth 3 times daily as needed (anxiety)     traZODone (DESYREL) 50 MG tablet Take 1 tablet (50 mg) by mouth nightly as needed for sleep     naloxone (NARCAN) 1 mg/mL for intranasal kit (2 syringes with 2 mucosal atomizer device) For opioid overdose, spray one-half syringe contents into each nostril.  Repeat after 3 minutes if no or minimal response.     IBUPROFEN PO Take 400 mg by mouth every 6 hours as needed for moderate pain     alum & mag hydroxide-simethicone (MYLANTA/MAALOX) 200-200-20 MG/5ML SUSP suspension Take 30 mLs by mouth every 6 hours as needed for indigestion     guaiFENesin (ROBITUSSIN) 20 mg/mL SOLN solution Take 10 mLs by mouth every 4 hours as needed for cough     phenol-menthol (CEPASTAT) 14.5 MG lozenge Place 1 lozenge inside cheek every 2 hours as needed for sore throat     loratadine (CLARITIN) 10 MG tablet Take 10 mg by mouth daily as needed for allergies     senna-docusate (SENOKOT-S;PERICOLACE) 8.6-50 MG per tablet Take 2 tablets by mouth daily as needed for constipation     MELATONIN PO Take 3 mg by mouth nightly as needed     No current facility-administered medications for this encounter.      Facility-Administered Medications Ordered in Other Encounters   Medication     Self Administer Medications: Behavioral Services        Current Outpatient Prescriptions on File Prior to Encounter:  DULoxetine (CYMBALTA) 20 MG EC capsule Take 1 capsule (20 mg) by mouth 2 times daily   acetaminophen (TYLENOL) 325 MG tablet Take 1-2 tablets (325-650 mg) by mouth every 4 hours as needed for mild pain or fever    naproxen (NAPROSYN) 500 MG tablet Take 1 tablet (500 mg) by mouth 2 times daily as needed for moderate pain   hydrOXYzine (ATARAX) 25 MG tablet Take 1-2 tablets (25-50 mg) by mouth every 6 hours as needed for anxiety   gabapentin (NEURONTIN) 300 MG capsule Take 1 capsule (300 mg) by mouth 3 times daily as needed (anxiety)   traZODone (DESYREL) 50 MG tablet Take 1 tablet (50 mg) by mouth nightly as needed for sleep   naloxone (NARCAN) 1 mg/mL for intranasal kit (2 syringes with 2 mucosal atomizer device) For opioid overdose, spray one-half syringe contents into each nostril.  Repeat after 3 minutes if no or minimal response.   IBUPROFEN PO Take 400 mg by mouth every 6 hours as needed for moderate pain   alum & mag hydroxide-simethicone (MYLANTA/MAALOX) 200-200-20 MG/5ML SUSP suspension Take 30 mLs by mouth every 6 hours as needed for indigestion   guaiFENesin (ROBITUSSIN) 20 mg/mL SOLN solution Take 10 mLs by mouth every 4 hours as needed for cough   phenol-menthol (CEPASTAT) 14.5 MG lozenge Place 1 lozenge inside cheek every 2 hours as needed for sore throat   loratadine (CLARITIN) 10 MG tablet Take 10 mg by mouth daily as needed for allergies   senna-docusate (SENOKOT-S;PERICOLACE) 8.6-50 MG per tablet Take 2 tablets by mouth daily as needed for constipation   MELATONIN PO Take 3 mg by mouth nightly as needed     Current Facility-Administered Medications on File Prior to Encounter:  Self Administer Medications: Behavioral Services          Allergies:   No Known Allergies         Psychiatric Examination:     Weight is 0 lbs 0 oz  There is no height or weight on file to calculate BMI.    Appearance:  awake, alert and adequately groomed  Attitude:  cooperative  Eye Contact:  good  Mood:  anxious  Affect:  appropriate and in normal range and mood congruent  Speech:  clear, coherent rate /rhythm are good  Psychomotor Behavior:  no evidence of tardive dyskinesia, dystonia, or tics and intact station, gait and muscle  tone  Throught Process:  logical  Associations:  no loose associations  Thought Content:  no evidence of suicidal ideation or homicidal ideation, no evidence of psychotic thought, no auditory hallucinations present and no visual hallucinations present  Insight:  good  Judgement:  intact  Oriented to:  time, person, and place  Attention Span and Concentration:  intact  Recent and Remote Memory:  intact  Language fund of knowledge are adequate               Labs:     No results found for: NTBNPI, NTBNP  Lab Results   Component Value Date    WBC 8.1 05/01/2017    HGB 13.8 05/01/2017    HCT 41.8 05/01/2017    MCV 91 05/01/2017     05/01/2017     No results found for: TSH        DIagnoses:            Plan:       1. Major depressive disorder, recurrent without psychotic features.   2. Generalized anxiety disorder.   3. Opiate dependence.   4. Nicotine dependence.       PLAN: The patient will increase  Cymbalta 30 mg twice a day.. Her ZBIGNIEW-7 score is 16. PHQ-9 score is 11. Continue present meds  F/u as per counselours  Naltrexone shot on Monday , will talk with bernardino moffett  Able to give informed consent:  YES       Discussed Risks/Benefits/Side Effects/Alternatives: YES      Discussed with patient many issues of addiction,triggers/ relapse, and establishing a solid recovery program.

## 2017-05-11 NOTE — PROGRESS NOTES
"Pt presented her \"Book of Me\" assignment in group therapy today. Pt reported she struggles with expressing anger and challenges with co-dependency.  Pt appears to struggle with deep shame related to her substance use and loss of identity. Pt stated she views herself as having \"untapt potential,\" but unable to move forward with life goals that include finishing her degree.  Pt reported she was successful with past positions of employment and is proud to be raising two beautiful children. Pt received supportive feedback regarding her honesty and using positive affirmations to increase self-confidence. Pt to continue working on treatment plan goals and assignments.     Elodia Thompson, CHAY  "

## 2017-05-11 NOTE — TELEPHONE ENCOUNTER
Incoming call from [facility] requesting orders for a Vivitrol injection scheduled today.     Jil Vogel

## 2017-05-12 ENCOUNTER — OFFICE VISIT (OUTPATIENT)
Dept: ADDICTION MEDICINE | Facility: CLINIC | Age: 32
End: 2017-05-12
Payer: COMMERCIAL

## 2017-05-12 ENCOUNTER — HOSPITAL ENCOUNTER (OUTPATIENT)
Dept: BEHAVIORAL HEALTH | Facility: CLINIC | Age: 32
End: 2017-05-12
Attending: PSYCHIATRY & NEUROLOGY
Payer: COMMERCIAL

## 2017-05-12 VITALS
RESPIRATION RATE: 16 BRPM | BODY MASS INDEX: 21.09 KG/M2 | DIASTOLIC BLOOD PRESSURE: 78 MMHG | HEART RATE: 78 BPM | SYSTOLIC BLOOD PRESSURE: 124 MMHG | OXYGEN SATURATION: 99 % | WEIGHT: 155.5 LBS | TEMPERATURE: 98.1 F

## 2017-05-12 DIAGNOSIS — G89.29 CHRONIC NONMALIGNANT PAIN: Chronic | ICD-10-CM

## 2017-05-12 DIAGNOSIS — F11.20 OPIOID USE DISORDER, SEVERE, DEPENDENCE (H): Primary | Chronic | ICD-10-CM

## 2017-05-12 LAB
AMPHETAMINES UR QL: NORMAL NG/ML
BARBITURATES UR QL SCN: NORMAL NG/ML
BENZODIAZ UR QL SCN: NORMAL NG/ML
BUPRENORPHINE UR QL: NORMAL NG/ML
CANNABINOIDS UR QL: NORMAL NG/ML
COCAINE UR QL SCN: NORMAL NG/ML
D-METHAMPHET UR QL: NORMAL NG/ML
METHADONE UR QL SCN: NORMAL NG/ML
OPIATES UR QL SCN: NORMAL NG/ML
OXYCODONE UR QL SCN: NORMAL NG/ML
PCP UR QL SCN: NORMAL NG/ML
PROPOXYPH UR QL: NORMAL NG/ML
TRICYCLICS UR QL SCN: NORMAL NG/ML

## 2017-05-12 PROCEDURE — 80306 DRUG TEST PRSMV INSTRMNT: CPT | Performed by: PEDIATRICS

## 2017-05-12 PROCEDURE — 10020000 ZZH LODGING PLUS FACILITY CHARGE ADULT

## 2017-05-12 PROCEDURE — H2035 A/D TX PROGRAM, PER HOUR: HCPCS | Mod: HQ

## 2017-05-12 PROCEDURE — 99204 OFFICE O/P NEW MOD 45 MIN: CPT | Performed by: PEDIATRICS

## 2017-05-12 NOTE — MR AVS SNAPSHOT
After Visit Summary   5/12/2017    Mecca Thorne    MRN: [id number]           Patient Information     Date Of Birth          1985        Visit Information        Provider Department      5/12/2017 3:40 PM Mecca Byrne MD [facility]        Today's Diagnoses     Opioid use disorder, severe, dependence (H)    -  1    Chronic nonmalignant pain           Follow-ups after your visit        Your next 10 appointments already scheduled     May 16, 2017  7:15 AM CDT   Treatment with [facility]ULT [facility]   [facility] ([facility])    [address] [place] [address]   [phone]            May 17, 2017  7:15 AM CDT   Treatment with ADULT LODGING PLUS E   [facility] ([facility])    [address] [place] [address]   [phone]            May 18, 2017  7:15 AM CDT   Treatment with ADULT LODGING PL[facility] E   [facility] ([facility])    [address] [place] [address]   [phone]            May 19, 2017  7:15 AM CDT   Treatment with ADULT LODGING PLUS E   [facility] ([facility])    [address] [place] [address]   [phone]            May 20, 2017  7:15 AM CDT   Treatment with ADULT LODGING PLUS E   [facility] ([facility])    [address] [place] [address]   [phone]            Jun 09, 2017 11:00 AM CDT   Return Visit with Mecca Byrne MD   [facility] ([facility])    [address]  [address]  [facility] [address]   [phone]              Who to contact     If you have questions or need  "follow up information about today's clinic visit or your schedule please contact [facility] directly at [phone].  Normal or non-critical lab and imaging results will be communicated to you by Deemelohart, letter or phone within 4 business days after the clinic has received the results. If you do not hear from us within 7 days, please contact the clinic through Deemelohart or phone. If you have a critical or abnormal lab result, we will notify you by phone as soon as possible.  Submit refill requests through RigUp or call your pharmacy and they will forward the refill request to us. Please allow 3 business days for your refill to be completed.          Additional Information About Your Visit        [email or web address] Information     RigUp lets you send messages to your doctor, view your test results, renew your prescriptions, schedule appointments and more. To sign up, go to [email or web address] . Click on \"Log in\" on the left side of the screen, which will take you to the Welcome page. Then click on \"Sign up Now\" on the right side of the page.     You will be asked to enter the access code listed below, as well as some personal information. Please follow the directions to create your username and password.     Your access code is: [email or web address]  Expires: 2017  1:10 PM     Your access code will  in 90 days. If you need help or a new code, please call your [place] clinic or [phone].        Care EveryWhere ID     This is your Care EveryWhere ID. This could be used by other organizations to access your [place] medical records  [id number]        Your Vitals Were     Pulse Temperature Respirations Last Period Pulse Oximetry BMI (Body Mass Index)    78 98.1  F (36.7  C) (Oral) 16 2017 (Exact Date) 99% 21.09 kg/m2       Blood Pressure from Last 3 Encounters:   05/15/17 110/62   17 124/78   17 113/69    Weight from Last 3 Encounters:   17 155 lb 8 oz (70.5 kg) "   04/30/17 163 lb 6.4 oz (74.1 kg)              We Performed the Following     Urine Drugs of Abuse Screen Panel 13        Primary Care Provider Office Phone # Fax #    KYLER Marx [phone]       [facility] [address] [place] [id number]        Thank you!     Thank you for choosing [facility]  for your care. Our goal is always to provide you with excellent care. Hearing back from our patients is one way we can continue to improve our services. Please take a few minutes to complete the written survey that you may receive in the mail after your visit with us. Thank you!             Your Updated Medication List - Protect others around you: Learn how to safely use, store and throw away your medicines at www.disposemymeds.org.          This list is accurate as of: 5/12/17 11:59 PM.  Always use your most recent med list.                   Brand Name Dispense Instructions for use    acetaminophen 325 MG tablet    TYLENOL    100 tablet    Take 1-2 tablets (325-650 mg) by mouth every 4 hours as needed for mild pain or fever       alum & mag hydroxide-simethicone 200-200-20 MG/5ML Susp suspension    MYLANTA/MAALOX     Take 30 mLs by mouth every 6 hours as needed for indigestion       DULoxetine 30 MG EC capsule    CYMBALTA    90 capsule    Take 1 capsule (30 mg) by mouth 2 times daily       gabapentin 300 MG capsule    NEURONTIN    90 capsule    Take 1 capsule (300 mg) by mouth 3 times daily as needed (anxiety)       guaiFENesin 20 mg/mL Soln solution    ROBITUSSIN     Take 10 mLs by mouth every 4 hours as needed for cough       hydrOXYzine 25 MG tablet    ATARAX    120 tablet    Take 1-2 tablets (25-50 mg) by mouth every 6 hours as needed for anxiety       IBUPROFEN PO      Take 400 mg by mouth every 6 hours as needed for moderate pain       loratadine 10 MG tablet    CLARITIN     Take 10 mg by mouth daily as needed for allergies       MELATONIN  PO      Take 3 mg by mouth nightly as needed       naloxone 1 mg/mL for intranasal kit (2 syringes with 2 mucosal atomizer device)    NARCAN    1 kit    For opioid overdose, spray one-half syringe contents into each nostril.  Repeat after 3 minutes if no or minimal response.       naproxen 500 MG tablet    NAPROSYN    60 tablet    Take 1 tablet (500 mg) by mouth 2 times daily as needed for moderate pain       phenol-menthol 14.5 MG lozenge      Place 1 lozenge inside cheek every 2 hours as needed for sore throat       senna-docusate 8.6-50 MG per tablet    SENOKOT-S;PERICOLACE     Take 2 tablets by mouth daily as needed for constipation       tiZANidine 2 MG tablet    ZANAFLEX    12 tablet    Take 1 tablet (2 mg) by mouth nightly as needed for muscle spasms       traZODone 50 MG tablet    DESYREL    30 tablet    Take 1 tablet (50 mg) by mouth nightly as needed for sleep

## 2017-05-12 NOTE — PROGRESS NOTES
SUBJECTIVE:                                                        Mecca Thorne is a 31 year old female who presents for  initial visit for addiction consultation and management referred by [facility].     HPI:  3A 4/30-5/2.  Buprenorphine taper with plan to Vivitrol.. NS appt. 5/10 [facility].   Last use 4/29 Heroin.  Last Suboxone 5/2.  Patient is interested in Vivitrol for craving /MAT.  Has appt scheduled for Vivitrol already.     CD History:     DRUG OF CHOICE -   Started with Oxycontin/Oxycodone for back pain for about 8yr.    Little doctor shopping, several pain clinics, buying illicitly.  Then for about last year heroin smoking about 1/4-1/2gm /day.   She told family and fiance family and sister was calling for detox bed and got her in here.    Some meth use few times.       LONGEST PERIOD OF SOBRIETY-none    PREVIOUS DETOX/TREATMENT PROGRAMS-none    HISTORY OF OVERDOSE-none    PREVIOUS MEDICATION ASSISTED TREATMENT:  none      Other Substances:    ALCOHOL-rarely drink.  Some in High school.    MARIJUANA-In high school none since.   COCAINE/CRACK-cocaine in high school and occasional since.  Adderall prior to detox.    METH/AMPHETAMINES-occasional  OPIATES-as above  BENZODIAZEPINES-none    NICOTINE-1/2 ppd   Desire to quit maybe later.           PAST PSYCHIATRIC HISTORY   Depression, anxiety.  No PTSD, or eating disorder.     Patient Active Problem List    Diagnosis Date Noted     Chemical dependency (H) 05/03/2017     Priority: Medium     Opioid use disorder, severe, dependence (H) 05/01/2017     Priority: Medium     Chronic nonmalignant pain 05/01/2017     Priority: Medium     Lumbar disc herniation       Opioid dependence with withdrawal (H) 04/30/2017     Priority: Medium       Problem list and histories reviewed & adjusted, as indicated.  Additional history: as documented     No past medical history on file.    No past surgical history on file.      Family History   Problem  Relation Age of Onset     Alcoholism Mother      Alcoholism Father      Depression Father          Social History     Social History Narrative     partnered (has fiance), unemployed female, mother of two minor children,         Current Outpatient Prescriptions   Medication Sig Dispense Refill     DULoxetine (CYMBALTA) 30 MG EC capsule Take 1 capsule (30 mg) by mouth 2 times daily 90 capsule 0     tiZANidine (ZANAFLEX) 2 MG tablet Take 1 tablet (2 mg) by mouth nightly as needed for muscle spasms 12 tablet 0     acetaminophen (TYLENOL) 325 MG tablet Take 1-2 tablets (325-650 mg) by mouth every 4 hours as needed for mild pain or fever 100 tablet 0     naproxen (NAPROSYN) 500 MG tablet Take 1 tablet (500 mg) by mouth 2 times daily as needed for moderate pain 60 tablet 0     hydrOXYzine (ATARAX) 25 MG tablet Take 1-2 tablets (25-50 mg) by mouth every 6 hours as needed for anxiety 120 tablet 0     gabapentin (NEURONTIN) 300 MG capsule Take 1 capsule (300 mg) by mouth 3 times daily as needed (anxiety) 90 capsule 0     traZODone (DESYREL) 50 MG tablet Take 1 tablet (50 mg) by mouth nightly as needed for sleep 30 tablet 0     naloxone (NARCAN) 1 mg/mL for intranasal kit (2 syringes with 2 mucosal atomizer device) For opioid overdose, spray one-half syringe contents into each nostril.  Repeat after 3 minutes if no or minimal response. 1 kit 1     IBUPROFEN PO Take 400 mg by mouth every 6 hours as needed for moderate pain       alum & mag hydroxide-simethicone (MYLANTA/MAALOX) 200-200-20 MG/5ML SUSP suspension Take 30 mLs by mouth every 6 hours as needed for indigestion       guaiFENesin (ROBITUSSIN) 20 mg/mL SOLN solution Take 10 mLs by mouth every 4 hours as needed for cough       phenol-menthol (CEPASTAT) 14.5 MG lozenge Place 1 lozenge inside cheek every 2 hours as needed for sore throat       loratadine (CLARITIN) 10 MG tablet Take 10 mg by mouth daily as needed for allergies       senna-docusate (SENOKOT-S;PERICOLACE)  8.6-50 MG per tablet Take 2 tablets by mouth daily as needed for constipation       MELATONIN PO Take 3 mg by mouth nightly as needed           No Known Allergies      [place] Board  Pharmacy Data Base Reviewed:    YES; Monthly RX Oxycodone #120  Last 4/14       REVIEW OF SYSTEMS:    General: No acute withdrawal symptoms.  No recent infections or fever  Eyes: Negative for vision changes or eye problems  ENT: No problems with ears, nose or throat.  No difficulty swallowing.  Resp: No coughing, wheezing or shortness of breath  CV: No chest pains or palpitations  GI: No nausea, vomiting, abdominal pain, diarrhea, constipation  : No urinary frequency or dysuria,     Musculoskeletal: No significant muscle or joint pains, No edema  Neurologic: No numbness, tingling, weakness, problems with balance or coordination  Psychiatric: anxiety  Skin: No rashes        OBJECTIVE:                                                      EXAM    Blood pressure 124/78, pulse 78, temperature 98.1  F (36.7  C), temperature source Oral, resp. rate 16, weight 155 lb 8 oz (70.5 kg), last menstrual period 04/29/2017, SpO2 99 %, not currently breastfeeding.    GENERAL APPEARANCE: healthy, alert and no distress  EYES: Eyes grossly normal to inspection, PERRL and no nystagmus   HENT: ear canals and TM's normal, nose and mouth without ulcers or lesions and normal cephalic/atraumatic  NECK: no adenopathy, thyromegaly or masses  RESP: lungs clear to auscultation - no rales, rhonchi or wheezes and no resp distress  CV: regular rates and rhythm, normal S1 S2, no S3 or S4 and no murmur, click or rub  ABDOMEN: soft, nontender, without hepatosplenomegaly or masses  MS: extremities normal- no gross deformities noted, gait normal, peripheral pulses normal and no edema  SKIN: no rashes, no jaundice, no obvious masses.   NEURO: Normal strength and tone, sensory exam grossly normal, no tremor  MENTAL STATUS EXAM:  Appearance/Behavior: No apparent  distress  Speech: Normal  Mood/Affect: normal affect  Insight: Adequate    Results for orders placed or performed in visit on 05/12/17   Urine Drugs of Abuse Screen Panel 13   Result Value Ref Range    Cannabinoids (76-cmk-6-carboxy-9-THC)  NDET ng/mL     Not Detected   Cutoff for a negative cannabinoid is 50 ng/mL or less.      Phencyclidine (Phencyclidine)  NDET ng/mL     Not Detected   Cutoff for a negative PCP is 25 ng/mL or less.      Cocaine (Benzoylecgonine)  NDET ng/mL     Not Detected   Cutoff for a negative cocaine is 150 ng/ml or less.      Methamphetamine (d-Methamphetamine)  NDET ng/mL     Not Detected   Cutoff for a negative methamphetamine is 500 ng/ml or less.      Opiates (Morphine)  NDET ng/mL     Not Detected   Cutoff for a negative opiate is 100 ng/ml or less.      Amphetamine (d-Amphetamine)  NDET ng/mL     Not Detected   Cutoff for a negative amphetamine is 500 ng/mL or less.      Benzodiazepines (Nordiazepam)  NDET ng/mL     Not Detected   Cutoff for a negative benzodiazepine is 150 ng/ml or less.      Tricyclic Antidepressants (Desipramine)  NDET ng/mL     Not Detected   Cutoff for a negative tricyclic antidepressant is 300 ng/ml or less.      Methadone (Methadone)  NDET ng/mL     Not Detected   Cutoff for a negative methadone is 200 ng/ml or less.      Barbiturates (Butalbital)  NDET ng/mL     Not Detected   Cutoff for a negative barbituate is 200 ng/ml or less.      Oxycodone (Oxycodone)  NDET ng/mL     Not Detected   Cutoff for a negative Oxycodone is 100 ng/mL or less.      Propoxyphene (Norpropoxyphene)  NDET ng/mL     Not Detected   Cutoff for a negative propoxyphene is 300 ng/ml or less      Buprenorphine (Buprenorphine)  NDET ng/mL     Not Detected   Cutoff for a negative buprenorphine is 10 ng/ml or less                          ASSESSMENT/PLAN:    (F11.20) Opioid use disorder, severe, dependence (H)  (primary encounter diagnosis)  Plan: Urine Drugs of Abuse Screen Panel 13         Patient interested in Vivitrol.   Buprenorphine also discussed. Risks, benefits, side effects and intended purposes discussed.      Vivitrol appt already scheduled.  Vulnerability to opioid overdose: Following a person s treatment with extended-release  injectable naltrexone, his or her opioid tolerance is reduced from pretreatment  baseline, and patients are vulnerable to potentially fatal overdose at the end of a  dosing interval, after missing a dose, or after discontinuing treatment. Attempts to  overcome the blockade may also lead to fatal overdose. This was communicated to the patient who understands this risk.   Follow up one month.        (G89.29) Chronic nonmalignant pain  Plan: Ongoing low back pain discussed.  Post opoid hypnoanalgesia reviewed.  Follow up with PCP as needed.         ENCOUNTER FOR LONG TERM USE OF HIGH RISK MEDICATION   High Risk Drug Monitoring?  YES   Drug being monitored: Vivitrol   Reason for drug: Opioid Use Disorder   What is being monitored?: Dosage, Cravings, Triggers and side effects         Mecca Byrne MD  [facility]

## 2017-05-12 NOTE — PROGRESS NOTES
Patient Safety Plan Template      Name:   Mecca Thorne  Date of Birth:  [birth year] Age:  31 MR Number:  [id number]   Step 1: Warning signs (Thoughts, images, mood, situation, behavior) that a crisis may be developin.  Situation- If drugs are around    2. Thoughts- If cravings start    3. Mood- If my mood is negative and I want to use   Step 2: Internal coping strategies - Things I can do to take my mind off of my problems without contacting another person (relaxation technique, physical activity):   1. Keep busy       2. Go for a walk    3.  Go for a drive    Step 3: People and social settings that provide distraction:   1. Name: Aranza Thorne  Phone: [phone]   2. Name: Bree Thorne  Phone: [phone]   3. Place: [place]  4. Place: [place]       The one thing that is most important to me and worth living for is: Not answered    Step 4: People whom I can ask for help:   1. Name: Aranza Phone: [phone]   2. Name: Bree Phone: [phone]   3. Name: Nestor  Phone: [phone]   Step 5: Professionals or agencies I can contact during a crisis:   1. Clinician Name: Dr. Marx at [facility]   Phone: [phone]   Clinician Pager or Emergency Contact #: Pt did not answer             3. Local Urgent Care Services: Pt did not answer  Urgent Care Services Address:   Urgent Care Services Phone:    4. Suicide Prevention Lifeline Phone:    Step 6: Making the environment safe:   1. No paraphanelia   2. No drug use   Safety Plan Template 2008 Teresa Mix and Kevin Castro is reprinted with the express permission of the authors. No portion of the Safety Plan Template may be reproduced without the express, written permission. You can contact the authors at bhs@[facility] or [email or web address].

## 2017-05-13 ENCOUNTER — HOSPITAL ENCOUNTER (OUTPATIENT)
Dept: BEHAVIORAL HEALTH | Facility: CLINIC | Age: 32
End: 2017-05-13
Attending: PSYCHIATRY & NEUROLOGY
Payer: COMMERCIAL

## 2017-05-13 PROCEDURE — 10020000 ZZH LODGING PLUS FACILITY CHARGE ADULT

## 2017-05-13 PROCEDURE — H2035 A/D TX PROGRAM, PER HOUR: HCPCS | Mod: HQ

## 2017-05-13 NOTE — PROGRESS NOTES
Name: Mecca Thorne  Date: 5/13/2017  Medical Record: [id number]    Envelope Number: [id number]    List of Contents (List each item separately in new row):   Duloxetine     Admission:  I am responsible for any personal items that are not sent to the safe or pharmacy.  [facility] is not responsible for loss, theft or damage of any property in my possession.      Patient Signature:  ___________________________________________       Date/Time:__________________________    Staff Signature: __________________________________       Date/Time:__________________________    2nd Staff person, if patient is unable/unwilling to sign:      __________________________________________________________       Date/Time: __________________________      Discharge:  [facility] has returned all of my personal belongings:    Patient Signature: ________________________________________     Date/Time: ____________________________________    Staff Signature: ______________________________________     Date/Time:_____________________________________

## 2017-05-14 ENCOUNTER — HOSPITAL ENCOUNTER (OUTPATIENT)
Dept: BEHAVIORAL HEALTH | Facility: CLINIC | Age: 32
End: 2017-05-14
Attending: PSYCHIATRY & NEUROLOGY
Payer: COMMERCIAL

## 2017-05-14 PROCEDURE — H2035 A/D TX PROGRAM, PER HOUR: HCPCS | Mod: HQ

## 2017-05-14 PROCEDURE — 10020000 ZZH LODGING PLUS FACILITY CHARGE ADULT

## 2017-05-15 ENCOUNTER — HOSPITAL ENCOUNTER (OUTPATIENT)
Dept: BEHAVIORAL HEALTH | Facility: CLINIC | Age: 32
End: 2017-05-15
Attending: PSYCHIATRY & NEUROLOGY
Payer: COMMERCIAL

## 2017-05-15 ENCOUNTER — INFUSION THERAPY VISIT (OUTPATIENT)
Dept: INFUSION THERAPY | Facility: CLINIC | Age: 32
End: 2017-05-15
Attending: PEDIATRICS
Payer: COMMERCIAL

## 2017-05-15 VITALS
RESPIRATION RATE: 16 BRPM | TEMPERATURE: 98.3 F | DIASTOLIC BLOOD PRESSURE: 62 MMHG | OXYGEN SATURATION: 99 % | HEART RATE: 67 BPM | SYSTOLIC BLOOD PRESSURE: 110 MMHG

## 2017-05-15 DIAGNOSIS — F11.20 OPIOID USE DISORDER, SEVERE, DEPENDENCE (H): Primary | ICD-10-CM

## 2017-05-15 PROCEDURE — 25000128 H RX IP 250 OP 636: Performed by: PEDIATRICS

## 2017-05-15 PROCEDURE — 10020000 ZZH LODGING PLUS FACILITY CHARGE ADULT

## 2017-05-15 PROCEDURE — 96372 THER/PROPH/DIAG INJ SC/IM: CPT

## 2017-05-15 PROCEDURE — H2035 A/D TX PROGRAM, PER HOUR: HCPCS | Mod: HQ

## 2017-05-15 RX ADMIN — NALTREXONE 380 MG: KIT at 09:49

## 2017-05-15 ASSESSMENT — PAIN SCALES - GENERAL: PAINLEVEL: NO PAIN (0)
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
## 2017-05-15 NOTE — MR AVS SNAPSHOT
After Visit Summary   5/15/2017    Mecca Thorne    MRN: [id number]           Patient Information     Date Of Birth          1985        Visit Information        Provider Department      5/15/2017 9:30 AM UR  [facility] [place]        Today's Diagnoses     Opioid use disorder, severe, dependence (H)    -  1       Follow-ups after your visit        Your next 10 appointments already scheduled     May 16, 2017  7:15 AM CDT   Treatment with ADULT LODGING PLUS E   [facility] ([facility])    [address] [place] [address]   [phone]            May 17, 2017  7:15 AM CDT   Treatment with ADULT LODGING PLUS E   [facility] ([facility])    [address] [place] [address]   [phone]            May 18, 2017  7:15 AM CDT   Treatment with ADULT LODGING PLUS E   [facility] ([facility])    [address] [place] [address]   [phone]            May 19, 2017  7:15 AM CDT   Treatment with ADULT LODGING PLUS E   [facility] ([facility])    [address] [place] [address]   [phone]            May 20, 2017  7:15 AM CDT   Treatment with ADULT LODGING PLUS E   [facility] ([facility])    [address] [place] [address]   [phone]            Jun 09, 2017 11:00 AM CDT   Return Visit with Mecca Byrne MD   [facility] ([facility])    [address]  [address]  [facility] [address] [phone]              Who to contact     If you have questions or need follow up information about today's clinic visit or your  "schedule please contact [facility], INFUSION SERVICES directly at [phone].  Normal or non-critical lab and imaging results will be communicated to you by MyChart, letter or phone within 4 business days after the clinic has received the results. If you do not hear from us within 7 days, please contact the clinic through MyChart or phone. If you have a critical or abnormal lab result, we will notify you by phone as soon as possible.  Submit refill requests through [employer] or call your pharmacy and they will forward the refill request to us. Please allow 3 business days for your refill to be completed.          Additional Information About Your Visit        [employer] Mobile Information     [employer] lets you send messages to your doctor, view your test results, renew your prescriptions, schedule appointments and more. To sign up, go to [email or web address] . Click on \"Log in\" on the left side of the screen, which will take you to the Welcome page. Then click on \"Sign up Now\" on the right side of the page.     You will be asked to enter the access code listed below, as well as some personal information. Please follow the directions to create your username and password.     Your access code is: [email or web address]  Expires: 2017  1:10 PM     Your access code will  in 90 days. If you need help or a new code, please call your [place] clinic or [phone].        Care EveryWhere ID     This is your Care EveryWhere ID. This could be used by other organizations to access your [facility] medical records  [id number]        Your Vitals Were     Pulse Temperature Respirations Last Period Pulse Oximetry       67 98.3  F (36.8  C) 16 2017 (Exact Date) 99%        Blood Pressure from Last 3 Encounters:   05/15/17 110/62   17 124/78    Weight from Last 3 Encounters:   17 70.5 kg (155 lb 8 oz)              Today, you had the following     No orders found for display       Primary Care Provider Office Phone # " Fax #    KYLER Marx [phone]       [address] [address]        Thank you!     Thank you for choosing [facility], [facility]  for your care. Our goal is always to provide you with excellent care. Hearing back from our patients is one way we can continue to improve our services. Please take a few minutes to complete the written survey that you may receive in the mail after your visit with us. Thank you!             Your Updated Medication List - Protect others around you: Learn how to safely use, store and throw away your medicines at www.disposemymeds.org.          This list is accurate as of: 5/15/17  9:55 AM.  Always use your most recent med list.                   Brand Name Dispense Instructions for use    acetaminophen 325 MG tablet    TYLENOL    100 tablet    Take 1-2 tablets (325-650 mg) by mouth every 4 hours as needed for mild pain or fever       alum & mag hydroxide-simethicone 200-200-20 MG/5ML Susp suspension    MYLANTA/MAALOX     Take 30 mLs by mouth every 6 hours as needed for indigestion       DULoxetine 30 MG EC capsule    CYMBALTA    90 capsule    Take 1 capsule (30 mg) by mouth 2 times daily       gabapentin 300 MG capsule    NEURONTIN    90 capsule    Take 1 capsule (300 mg) by mouth 3 times daily as needed (anxiety)       guaiFENesin 20 mg/mL Soln solution    ROBITUSSIN     Take 10 mLs by mouth every 4 hours as needed for cough       hydrOXYzine 25 MG tablet    ATARAX    120 tablet    Take 1-2 tablets (25-50 mg) by mouth every 6 hours as needed for anxiety       IBUPROFEN PO      Take 400 mg by mouth every 6 hours as needed for moderate pain       loratadine 10 MG tablet    CLARITIN     Take 10 mg by mouth daily as needed for allergies       MELATONIN PO      Take 3 mg by mouth nightly as needed       naloxone 1 mg/mL for intranasal kit (2 syringes with 2 mucosal atomizer device)    NARCAN    1 kit    For opioid  overdose, spray one-half syringe contents into each nostril.  Repeat after 3 minutes if no or minimal response.       naproxen 500 MG tablet    NAPROSYN    60 tablet    Take 1 tablet (500 mg) by mouth 2 times daily as needed for moderate pain       phenol-menthol 14.5 MG lozenge      Place 1 lozenge inside cheek every 2 hours as needed for sore throat       senna-docusate 8.6-50 MG per tablet    SENOKOT-S;PERICOLACE     Take 2 tablets by mouth daily as needed for constipation       tiZANidine 2 MG tablet    ZANAFLEX    12 tablet    Take 1 tablet (2 mg) by mouth nightly as needed for muscle spasms       traZODone 50 MG tablet    DESYREL    30 tablet    Take 1 tablet (50 mg) by mouth nightly as needed for sleep

## 2017-05-15 NOTE — PROGRESS NOTES
"Infusion Nursing Note:  Mecca Thorne presents today for naltrexone injection, first dose..    Patient seen by provider today: Yes: Currently at [facility]   present during visit today: Not Applicable.    Note: Patient allowed to leave immediately after injection.  She will return to [facility] and notify staff should she feel unwell..    Intravenous Access:  No Intravenous access/labs at this visit.  2\" needle used for right glute injection.    Treatment Conditions:  Not Applicable.      Post Infusion Assessment:  Patient tolerated injection without incident.    Discharge Plan:   Discharge instructions reviewed with: Patient.  Reviewed and given vivitrol (opioid dependance) pamphlet, dog tag, bracelet and wallet card.  Patient discharged in stable condition accompanied by: self.  Departure Mode: Ambulatory.    Sophia Rdz RN                        "

## 2017-05-16 ENCOUNTER — TELEPHONE (OUTPATIENT)
Dept: BEHAVIORAL HEALTH | Facility: CLINIC | Age: 32
End: 2017-05-16

## 2017-05-16 ENCOUNTER — HOSPITAL ENCOUNTER (OUTPATIENT)
Dept: BEHAVIORAL HEALTH | Facility: CLINIC | Age: 32
End: 2017-05-16
Attending: PSYCHIATRY & NEUROLOGY
Payer: COMMERCIAL

## 2017-05-16 PROCEDURE — H2035 A/D TX PROGRAM, PER HOUR: HCPCS | Mod: HQ

## 2017-05-16 PROCEDURE — 10020000 ZZH LODGING PLUS FACILITY CHARGE ADULT

## 2017-05-16 PROCEDURE — H2035 A/D TX PROGRAM, PER HOUR: HCPCS

## 2017-05-16 NOTE — PROGRESS NOTES
INDIVIDUAL SESSION SUMMARY    D) Met with client on 5/16/17 from 1:30-2:20. Client reported a mental health diagnosis of: depression and anxiety and to have recently started taking Cymbalta and Prozac. Client reported prior treatment or counseling experience. Client reported she has been with her feliberto for 17 years and have two children together ages 11 and 9. Client stated that the children are spending most evenings with feliberto's mom and is being cared for by several family members including her mom, sister and feliberto's mom. Client stated that she has not worked FT in a while and would want to find a PT job upon completing LP. Client reported mood has been: anxious because of housing but positive. Client reported that her sleep had been restless but improved the last three nights. Client identified resources including: feliberto's family, her mom and sister. Client identified strengths including: family involvement and readiness to learn. Client reported self-care activities including: volleyball, reading and working on assignments. Client spoke of stressors including: housing - she and feliberto are in the process of being evicted from their apartment - and needing to locate sober living, not knowing if extended family will continue to help care for her children and not knowing if her feliberto will do what he needs to do to get healthy himself. Client reported that she made a call to a sober house in [place] close to family that is affiliated with the [facility] program. Client stated that she would be willing to continue seeing a therapist individually. Client spoke about the emotional abuse, bullying, name-calling and controlling behavior that she has been experiencing with her feliberto. Therapist encouraged client to arrange for her children to speak with a child therapist.   I) Individual session with client. Provided client with verbal interventions including: nurturing, support, redirection, and healthy boundary  "setting.   A) Client appears to have a better understanding of her need to focus on herself and not wait for her fiance to get healthy himself. Client appears motivated and willing to seek continued aftercare and sober living. Client appears to lack skills for emotional regulation. Client appears to lack a sober support network outside of her family support.    P) Next session is scheduled for 5/17/17. Therapist loaned the client a copy of \"Gifts of Imperfection\" to read.   ZANE Edward  5/16/2017    "

## 2017-05-16 NOTE — TELEPHONE ENCOUNTER
A phone call was made as follow-up to the Family Program mailing for the week of 5/22/17. Message left or spoke in person to individuals on ELIZABETH.

## 2017-05-17 ENCOUNTER — HOSPITAL ENCOUNTER (OUTPATIENT)
Dept: BEHAVIORAL HEALTH | Facility: CLINIC | Age: 32
End: 2017-05-17
Attending: PSYCHIATRY & NEUROLOGY
Payer: COMMERCIAL

## 2017-05-17 PROCEDURE — 10020000 ZZH LODGING PLUS FACILITY CHARGE ADULT

## 2017-05-17 PROCEDURE — H2035 A/D TX PROGRAM, PER HOUR: HCPCS | Mod: HQ

## 2017-05-17 NOTE — PROGRESS NOTES
"5/17/17   Pt completed and shared assignment  \"30 day esteem builder.\"  She identified her character assets, physical assets, gratitudes and things she would like to change to strengthen herself and her recovery.  Pt has been encouraged to continue to practice affirming herself daily.   "

## 2017-05-18 ENCOUNTER — HOSPITAL ENCOUNTER (OUTPATIENT)
Dept: BEHAVIORAL HEALTH | Facility: CLINIC | Age: 32
End: 2017-05-18
Attending: PSYCHIATRY & NEUROLOGY
Payer: COMMERCIAL

## 2017-05-18 PROCEDURE — H2035 A/D TX PROGRAM, PER HOUR: HCPCS | Mod: HQ

## 2017-05-18 PROCEDURE — 10020000 ZZH LODGING PLUS FACILITY CHARGE ADULT

## 2017-05-19 ENCOUNTER — HOSPITAL ENCOUNTER (OUTPATIENT)
Dept: BEHAVIORAL HEALTH | Facility: CLINIC | Age: 32
End: 2017-05-19
Attending: PSYCHIATRY & NEUROLOGY
Payer: COMMERCIAL

## 2017-05-19 PROCEDURE — H2035 A/D TX PROGRAM, PER HOUR: HCPCS | Mod: HQ

## 2017-05-19 PROCEDURE — 10020000 ZZH LODGING PLUS FACILITY CHARGE ADULT

## 2017-05-20 ENCOUNTER — HOSPITAL ENCOUNTER (OUTPATIENT)
Dept: BEHAVIORAL HEALTH | Facility: CLINIC | Age: 32
End: 2017-05-20
Attending: PSYCHIATRY & NEUROLOGY
Payer: COMMERCIAL

## 2017-05-20 PROCEDURE — H2035 A/D TX PROGRAM, PER HOUR: HCPCS | Mod: HQ

## 2017-05-20 PROCEDURE — 10020000 ZZH LODGING PLUS FACILITY CHARGE ADULT

## 2017-05-21 ENCOUNTER — HOSPITAL ENCOUNTER (OUTPATIENT)
Dept: BEHAVIORAL HEALTH | Facility: CLINIC | Age: 32
End: 2017-05-21
Attending: PSYCHIATRY & NEUROLOGY
Payer: COMMERCIAL

## 2017-05-21 PROCEDURE — 10020000 ZZH LODGING PLUS FACILITY CHARGE ADULT

## 2017-05-21 PROCEDURE — H2035 A/D TX PROGRAM, PER HOUR: HCPCS | Mod: HQ

## 2017-05-22 ENCOUNTER — HOSPITAL ENCOUNTER (OUTPATIENT)
Dept: BEHAVIORAL HEALTH | Facility: CLINIC | Age: 32
End: 2017-05-22
Attending: PSYCHIATRY & NEUROLOGY
Payer: COMMERCIAL

## 2017-05-22 PROCEDURE — H2035 A/D TX PROGRAM, PER HOUR: HCPCS | Mod: HQ

## 2017-05-22 PROCEDURE — 10020000 ZZH LODGING PLUS FACILITY CHARGE ADULT

## 2017-05-22 PROCEDURE — 99214 OFFICE O/P EST MOD 30 MIN: CPT | Performed by: PSYCHIATRY & NEUROLOGY

## 2017-05-22 NOTE — PROGRESS NOTES
Interim history   This is a 31 year old female with 1. Major depressive disorder, recurrent without psychotic features.   2. Generalized anxiety disorder.   3. Opiate dependence.   4. Nicotine dependence. .Pt seen . Patient's mood is good   Anxious about s/o and children     Still anxious feel tense, worry , muscle tension  Energy Level:MODERATE  Sleep:poor   Appetite:better  motivation interest are improving   Denied any Suicidal/homicidal ideation/plan intent. Denied psychosis    Tolerating meds and has no side effects.         No prior suicide attempts      No past medical history on file.    10 point ROS is negative   constitutional    HEENT - no symptoms   RESPIRATORY-no symptoms   CVS-no symptoms   GI-no symptoms  MUSCKULOSKELETAL -no symptoms  NEUROLOGICAL-no symptoms  ENDOCRINE-no symptoms  HEMATAOLOGY-no symptoms  SKIN-no symptoms  Family History   Problem Relation Age of Onset     Alcoholism Mother      Alcoholism Father      Depression Father      Social History     Social History     Marital status: Single     Spouse name: N/A     Number of children: N/A     Years of education: N/A     Occupational History     Not on file.     Social History Main Topics     Smoking status: Current Every Day Smoker     Packs/day: 0.50     Years: 10.00     Smokeless tobacco: Never Used     Alcohol use Yes      Comment: occasional     Drug use: Yes      Comment: smokes heroin, coccaine     Sexual activity: Not on file     Other Topics Concern     Not on file     Social History Narrative     partnered (has fiance), unemployed female, mother of two minor children, (age 9 and 11).      FAMILY HISTORY: Mother has alcoholism. Dad has depression.       SOCIAL HISTORY: Born in [place], good childhood, no abuse. She has 1 older sister. She did a semester of college. Single, is engaged. Two kids. Support system consists of family and motivation. Stressors include finances and housing.          Medications:      Current Outpatient Prescriptions   Medication Sig     DULoxetine (CYMBALTA) 30 MG EC capsule Take 1 capsule (30 mg) by mouth 2 times daily     tiZANidine (ZANAFLEX) 2 MG tablet Take 1 tablet (2 mg) by mouth nightly as needed for muscle spasms     acetaminophen (TYLENOL) 325 MG tablet Take 1-2 tablets (325-650 mg) by mouth every 4 hours as needed for mild pain or fever     naproxen (NAPROSYN) 500 MG tablet Take 1 tablet (500 mg) by mouth 2 times daily as needed for moderate pain     hydrOXYzine (ATARAX) 25 MG tablet Take 1-2 tablets (25-50 mg) by mouth every 6 hours as needed for anxiety     gabapentin (NEURONTIN) 300 MG capsule Take 1 capsule (300 mg) by mouth 3 times daily as needed (anxiety)     traZODone (DESYREL) 50 MG tablet Take 1 tablet (50 mg) by mouth nightly as needed for sleep     naloxone (NARCAN) 1 mg/mL for intranasal kit (2 syringes with 2 mucosal atomizer device) For opioid overdose, spray one-half syringe contents into each nostril.  Repeat after 3 minutes if no or minimal response.     IBUPROFEN PO Take 400 mg by mouth every 6 hours as needed for moderate pain     alum & mag hydroxide-simethicone (MYLANTA/MAALOX) 200-200-20 MG/5ML SUSP suspension Take 30 mLs by mouth every 6 hours as needed for indigestion     guaiFENesin (ROBITUSSIN) 20 mg/mL SOLN solution Take 10 mLs by mouth every 4 hours as needed for cough     phenol-menthol (CEPASTAT) 14.5 MG lozenge Place 1 lozenge inside cheek every 2 hours as needed for sore throat     loratadine (CLARITIN) 10 MG tablet Take 10 mg by mouth daily as needed for allergies     senna-docusate (SENOKOT-S;PERICOLACE) 8.6-50 MG per tablet Take 2 tablets by mouth daily as needed for constipation     MELATONIN PO Take 3 mg by mouth nightly as needed     No current facility-administered medications for this encounter.      Facility-Administered Medications Ordered in Other Encounters   Medication     Self Administer Medications: Behavioral Services           Current Outpatient Prescriptions on File Prior to Encounter:  DULoxetine (CYMBALTA) 30 MG EC capsule Take 1 capsule (30 mg) by mouth 2 times daily   tiZANidine (ZANAFLEX) 2 MG tablet Take 1 tablet (2 mg) by mouth nightly as needed for muscle spasms   acetaminophen (TYLENOL) 325 MG tablet Take 1-2 tablets (325-650 mg) by mouth every 4 hours as needed for mild pain or fever   naproxen (NAPROSYN) 500 MG tablet Take 1 tablet (500 mg) by mouth 2 times daily as needed for moderate pain   hydrOXYzine (ATARAX) 25 MG tablet Take 1-2 tablets (25-50 mg) by mouth every 6 hours as needed for anxiety   gabapentin (NEURONTIN) 300 MG capsule Take 1 capsule (300 mg) by mouth 3 times daily as needed (anxiety)   traZODone (DESYREL) 50 MG tablet Take 1 tablet (50 mg) by mouth nightly as needed for sleep   naloxone (NARCAN) 1 mg/mL for intranasal kit (2 syringes with 2 mucosal atomizer device) For opioid overdose, spray one-half syringe contents into each nostril.  Repeat after 3 minutes if no or minimal response.   IBUPROFEN PO Take 400 mg by mouth every 6 hours as needed for moderate pain   alum & mag hydroxide-simethicone (MYLANTA/MAALOX) 200-200-20 MG/5ML SUSP suspension Take 30 mLs by mouth every 6 hours as needed for indigestion   guaiFENesin (ROBITUSSIN) 20 mg/mL SOLN solution Take 10 mLs by mouth every 4 hours as needed for cough   phenol-menthol (CEPASTAT) 14.5 MG lozenge Place 1 lozenge inside cheek every 2 hours as needed for sore throat   loratadine (CLARITIN) 10 MG tablet Take 10 mg by mouth daily as needed for allergies   senna-docusate (SENOKOT-S;PERICOLACE) 8.6-50 MG per tablet Take 2 tablets by mouth daily as needed for constipation   MELATONIN PO Take 3 mg by mouth nightly as needed     Current Facility-Administered Medications on File Prior to Encounter:  Self Administer Medications: Behavioral Services          Allergies:   No Known Allergies         Psychiatric Examination:     Weight is 0 lbs 0 oz  There is no  height or weight on file to calculate BMI.    Appearance:  awake, alert and adequately groomed  Attitude:  cooperative  Eye Contact:  good  Mood:  anxious  Affect:  appropriate and in normal range and mood congruent  Speech:  clear, coherent rate /rhythm are good  Psychomotor Behavior:  no evidence of tardive dyskinesia, dystonia, or tics and intact station, gait and muscle tone  Throught Process:  logical  Associations:  no loose associations  Thought Content:  no evidence of suicidal ideation or homicidal ideation, no evidence of psychotic thought, no auditory hallucinations present and no visual hallucinations present  Insight:  good  Judgement:  intact  Oriented to:  time, person, and place  Attention Span and Concentration:  intact  Recent and Remote Memory:  intact  Language fund of knowledge are adequate               Labs:     No results found for: NTBNPI, NTBNP  Lab Results   Component Value Date    WBC 8.1 05/01/2017    HGB 13.8 05/01/2017    HCT 41.8 05/01/2017    MCV 91 05/01/2017     05/01/2017     No results found for: TSH        DIagnoses:            Plan:       1. Major depressive disorder, recurrent without psychotic features.   2. Generalized anxiety disorder.   3. Opiate dependence.   4. Nicotine dependence.       PLAN: The patient will increase  Cymbalta 90 mg  a day.. Her ZBIGNIEW-7 score is 15. PHQ-9 score is 7   increase trazadone  mg po qhs . Continue present meds  F/u as per counselours  Naltrexone shot on 5/15/17 , will talk with ayaz 6/8/17  Able to give informed consent:  YES       Discussed Risks/Benefits/Side Effects/Alternatives: YES      Discussed with patient many issues of addiction,triggers/ relapse, and establishing a solid recovery program.

## 2017-05-23 ENCOUNTER — HOSPITAL ENCOUNTER (OUTPATIENT)
Dept: BEHAVIORAL HEALTH | Facility: CLINIC | Age: 32
End: 2017-05-23
Attending: PSYCHIATRY & NEUROLOGY
Payer: COMMERCIAL

## 2017-05-23 PROCEDURE — H2035 A/D TX PROGRAM, PER HOUR: HCPCS | Mod: HQ

## 2017-05-23 PROCEDURE — 10020000 ZZH LODGING PLUS FACILITY CHARGE ADULT

## 2017-05-24 ENCOUNTER — HOSPITAL ENCOUNTER (OUTPATIENT)
Dept: BEHAVIORAL HEALTH | Facility: CLINIC | Age: 32
End: 2017-05-24
Attending: PSYCHIATRY & NEUROLOGY
Payer: COMMERCIAL

## 2017-05-24 PROCEDURE — H2035 A/D TX PROGRAM, PER HOUR: HCPCS | Mod: HQ

## 2017-05-24 PROCEDURE — 10020000 ZZH LODGING PLUS FACILITY CHARGE ADULT

## 2017-05-24 PROCEDURE — H2035 A/D TX PROGRAM, PER HOUR: HCPCS

## 2017-05-24 NOTE — PROGRESS NOTES
INDIVIDUAL SESSION SUMMARY    D) Met with client on 5/24/17 from 8:30-9:00. Client reported that she will be moving into a sober house in [place] on 5/30 and attending aftercare at [facility] along with attending meetings and staying in touch with LP peers. Client stated that she has the support she needs from her extended family to help with her 9 and 11 yr old so she can so her aftercare. Client spoke about current stressors including her 's using and her 11 year old daughter's anxiety. Client spoke about her 's behavior becoming more paranoid, accusing, and blaming. Client spoke about her daughter's worrying and asked for help on how to help daughter. Therapist offered to provide therapist referrals for both the client and her children. Client stated she would be willing to continue with individual therapy and find a therapist for her children to meet with.   I) Individual session with client. Provided client with verbal interventions including: nurturing, support, redirection, and healthy boundary setting.   A) Client appears to have a better understanding of her need to focus on herself and not wait for her fiance to get healthy himself. Client appears to lack skills for emotional regulation and stress management but seeking family support. Client appears to lack a sober support network outside but showing a willingness to connect with LP peers and continue with attending aftercare and meetings.     P) No future sessions scheduled. Therapist will provide therapy referrals.   Erin Marie, ZANE  5/24/2017

## 2017-05-24 NOTE — PROGRESS NOTES
D) Pt shared detailed chemical use history and listened as family shared their feelings of sadness   Over loss of relationship due to pt's drug use. Day 2 pt shared sadness and worry over the impact of her drug use on her children. A) Pt seems to be seeking support from family system.  Everyone understands the importance of good communication focused on sharing feelings.  P) Pt to follow counselors' recommendations.  Family to attend Phase 2/[facility] .

## 2017-05-24 NOTE — ADDENDUM NOTE
Encounter addended by: Kevin Caballero LADC on: 5/24/2017  4:48 PM<BR>     Actions taken: Sign clinical note

## 2017-05-24 NOTE — PROGRESS NOTES
Patient:  Mecca Thorne            Adult CD Progress Note and Treatment Plan Review     Attendance  Please refer to OP BEH CD Adult Attendance Record Documentation Flowsheet    Support group attended this week: no    Reporting sobriety:  no    Treatment Plan     Treatment Plan Review competed on:   5/25/17       Client preferred learning style: Visual  Hands on  Demonstration    Staff Members contributing  Makeda Mcintosh [facility], Elodia Thompson [facility],  Karthik Lamar [facility]                   Received Supervision:  yes    Client: contributed to goals and plan.    Client received copy of plan/revised plan: Yes    Client agrees with plan/revised plan: Yes    Changes to Treatment Plan: No    New Goals added since last review   NA    Goals worked on since last review   Continuing stabilization, relationships, aftercare planning, family week, 1.1 therapy, shame, esteem building    Strategies effective: yes    Strategies need these changes: NA      ASAM Risk Rating:    Dimension 1  0Pt met with Dr Byrne and received a vivitrol injection.  She appears full functioning    Dimension 2  0   Pt is able to attend all programming and access medical care if needed    Dimension 3  2  Pt reports feeling some anxiety, especially in relation to her significant partner continuing to use, her 11 year old daughter experiencing anxiety and seeking sober housing.  Pt shared an assignment identifying her positive qualities, strengths and character assets.  She reports she is continuing to gain confidence and self forgiveness. Pt continues to meet with therapist, Erin Marie, and finds it very beneficial.  She has been provided referrals for ongoing therapy.   Pt denies any thoughts of suicide or self harm at this time.      Dimension 4  1  Pt appears to be increasing her internal motivation for ongoing sobriety.  She reports feeling motivated by her children and other family members.      Dimension 5   4  Pt plans to attend relapse prevention  workshops over the upcoming weekend.  Pt appears to be gaining insight into her addiction, triggers and warning signs.  Pt rated her cravings at a 5, on a scale of 1 to 10, ten being high.  Pt recognizes she is unable to return to reside with her significant partner, as he continues to use. Pt is open to step down to the outpatient program at [facility] following [facility].  She plans to enter sober housing.    Dimension 6  3  Pt is seeking sober housing.  She participated in the family week program with her sister attending.  Pt reports this aided them in beginning to establish more open and honest communication.  Pt attends 12 step meetings and spends time with female peers.    Any changes in Vulnerable Adult Status?  No  If yes, add to treatment plan and individual abuse prevention plan.    Family Involvement:   Participating in family week this week    Data:   client did participate    Intervention:   Aftercare planning  Counselor feedback  Education  Emotional management  Group feedback  Relapse prevention    Assessment:   Stages of Change Model  Preparation/Determination    Appears/Sounds:  Cooperative  Motivated  Engaged    Plan:  Focus on recovery environment  Monitor emotional/physical health      CHAY Cerda

## 2017-05-25 ENCOUNTER — HOSPITAL ENCOUNTER (OUTPATIENT)
Dept: BEHAVIORAL HEALTH | Facility: CLINIC | Age: 32
End: 2017-05-25
Attending: PSYCHIATRY & NEUROLOGY
Payer: COMMERCIAL

## 2017-05-25 PROCEDURE — 10020000 ZZH LODGING PLUS FACILITY CHARGE ADULT

## 2017-05-25 PROCEDURE — H2035 A/D TX PROGRAM, PER HOUR: HCPCS | Mod: HQ

## 2017-05-25 NOTE — ADDENDUM NOTE
Encounter addended by: Karthik Lamar, [facility] on: 5/25/2017  8:41 AM<BR>     Actions taken: Pend clinical note, Sign clinical note

## 2017-05-26 ENCOUNTER — HOSPITAL ENCOUNTER (OUTPATIENT)
Dept: BEHAVIORAL HEALTH | Facility: CLINIC | Age: 32
End: 2017-05-26
Attending: PSYCHIATRY & NEUROLOGY
Payer: COMMERCIAL

## 2017-05-26 PROCEDURE — H2035 A/D TX PROGRAM, PER HOUR: HCPCS | Mod: HQ

## 2017-05-26 PROCEDURE — 10020000 ZZH LODGING PLUS FACILITY CHARGE ADULT

## 2017-05-26 NOTE — PROGRESS NOTES
DIM 5 RISK 4  Pt presented guilt, shame and self-forgiveness assignments in group. She shared examples of guilt and shame about her children not spending time with them or ttaking them to sporting events. Lying to her sister about pawning a gun belonging to her father. Pt gave examples of forgiving herself, not dwell on the past, make amends and have strong boundaries. Pt appears to have gained insight about herself. She seems to have begun to forgive herself. Pt to follow individual tx plan goals.

## 2017-05-27 ENCOUNTER — HOSPITAL ENCOUNTER (OUTPATIENT)
Dept: BEHAVIORAL HEALTH | Facility: CLINIC | Age: 32
End: 2017-05-27
Attending: PSYCHIATRY & NEUROLOGY
Payer: COMMERCIAL

## 2017-05-27 PROCEDURE — 10020000 ZZH LODGING PLUS FACILITY CHARGE ADULT

## 2017-05-27 PROCEDURE — H2035 A/D TX PROGRAM, PER HOUR: HCPCS | Mod: HQ

## 2017-05-28 ENCOUNTER — HOSPITAL ENCOUNTER (OUTPATIENT)
Dept: BEHAVIORAL HEALTH | Facility: CLINIC | Age: 32
End: 2017-05-28
Attending: PSYCHIATRY & NEUROLOGY
Payer: COMMERCIAL

## 2017-05-28 PROCEDURE — 10020000 ZZH LODGING PLUS FACILITY CHARGE ADULT

## 2017-05-28 PROCEDURE — H2035 A/D TX PROGRAM, PER HOUR: HCPCS | Mod: HQ

## 2017-05-28 NOTE — IP AVS SNAPSHOT
"                  MRN:[id number]                      After Visit Summary   2017    Mecca Thorne    MRN: [id number]           Visit Information        Provider Department      2017  7:15 AM ADULT LODGING PLUS E [facility]        Your next 10 appointments already scheduled     May 29, 2017  7:15 AM CDT   Treatment with ADULT LODGING PLUS E   [facility] ([facility])    [address] [address]   [phone]            May 30, 2017  7:15 AM CDT   Treatment with ADULT LODGING PLUS E   [facility] ([facility])    [address] [address]   [phone]            2017 11:00 AM CDT   Return Visit with Mecca Byrne MD   [facility] ([facility])    [address] [place] [address]  [address]  [facility] [address]   [phone]              MyChart Information     [employer] lets you send messages to your doctor, view your test results, renew your prescriptions, schedule appointments and more. To sign up, go to [email or web address] . Click on \"Log in\" on the left side of the screen, which will take you to the Welcome page. Then click on \"Sign up Now\" on the right side of the page.     You will be asked to enter the access code listed below, as well as some personal information. Please follow the directions to create your username and password.     Your access code is: [email or web address]  Expires: 2017  1:10 PM     Your access code will  in 90 days. If you need help or a new code, please call your [facility] or [phone].        Care EveryWhere ID     This is your Care EveryWhere ID. This could be used by other organizations to access your [facility] medical records  [id number]        "

## 2017-05-28 NOTE — PROGRESS NOTES
Nursing Discharge Planning Meeting      Writer completed discharge planning meeting with patient. Discharge is planned for Tuesday, 5/30/2017      Discussed appropriate follow up care to manage AILYN and to obtain medication refills. Patient given a copy of current medications for reference. Questions answered and patient verbalized understanding of post-discharge follow up plan.  Patient has appt scheduled with Dr. Byrne for continued Vivitrol.  Pt stated she will also contact PCP with [facility].      Continue to support patient in discharge planning as needed to assure appropriate continuity of care.       Essential Oil Therapy  Patient used essential oil therapy during treatment program: Yes                        If yes, was the essential oil therapy effective for managing sleep, pain, anxiety, or mood? Yes      Was using the essential oil therapy a trigger for any cravings or urges to use drugs or alcohol? No

## 2017-05-28 NOTE — IP AVS SNAPSHOT
Medication List       Patient:  KRISTA NI   :  1985   Physician:  KYLER Marx           This is your record.  Keep this with you and show to your community pharmacist(s) and physician(s) at each visit.     Allergies:  No Known Allergies          Medications  Valid as of: May 28, 2017 - 12:43 PM    Generic Name Brand Name Tablet Size Instructions for use    Acetaminophen TYLENOL 325 MG Take 1-2 tablets (325-650 mg) by mouth every 4 hours as needed for mild pain or fever        DULoxetine HCl CYMBALTA 30 MG Take 1 capsule (30 mg) by mouth 2 times daily Take 2 tab qam and 1 tab qhs        Gabapentin NEURONTIN 300 MG Take 1 capsule (300 mg) by mouth 3 times daily as needed (anxiety)        HydrOXYzine HCl ATARAX 25 MG Take 1-2 tablets (25-50 mg) by mouth every 6 hours as needed for anxiety        naloxone (NARCAN) 1 mg/mL for intranasal kit (2 syringes with 2 mucosal atomizer device) NARCAN 1 mg/mL For opioid overdose, spray one-half syringe contents into each nostril.  Repeat after 3 minutes if no or minimal response.        Naproxen NAPROSYN 500 MG Take 1 tablet (500 mg) by mouth 2 times daily as needed for moderate pain        TiZANidine HCl ZANAFLEX 2 MG Take 1 tablet (2 mg) by mouth nightly as needed for muscle spasms        TraZODone HCl DESYREL 50 MG Take 1-3 tablets ( mg) by mouth nightly as needed for sleep        .           .           .           .

## 2017-05-29 ENCOUNTER — HOSPITAL ENCOUNTER (OUTPATIENT)
Dept: BEHAVIORAL HEALTH | Facility: CLINIC | Age: 32
End: 2017-05-29
Attending: PSYCHIATRY & NEUROLOGY
Payer: COMMERCIAL

## 2017-05-29 PROCEDURE — 10020000 ZZH LODGING PLUS FACILITY CHARGE ADULT

## 2017-05-29 PROCEDURE — H2035 A/D TX PROGRAM, PER HOUR: HCPCS | Mod: HQ

## 2017-05-29 NOTE — PROGRESS NOTES
[address] [place] [address]        Mecca Thorne, 1985, was admitted for evaluation/treatment of chemical dependency at [facility].  This person took part in these program(s):    ______ The Inpatient Program   ______ The Outpatient Program   ___x__ The [facility]ging [facility] Program   ______ [facility] Outpatient       Date admitted: 05/02/17  Date discharged: 05/30/17    Type of discharge:   ___x__ Satisfactory - completed evaluation / treatment   ______ Discharged without completing   ______ Behavioral discharge   ______ Transferred to another chemical dependency program   ______ Transferred to another type of service   ______ Left against medical advice (AMA) / Eloped       Comments: Discharged to [facility] and referred to [facility] outpatient program      Counselor: CHAY Cerda                       Date: 5/29/2017             Time: 2:56 PM

## 2017-05-29 NOTE — PROGRESS NOTES
"5/29/17  Pt completed assignments \"Releasing Anger\" and  \"Assertiveness\" identifying past denial, along with passive and passive/agressive tendencies.  She shared ways she can manage her anger effectively.  Pt shared her relapse prevention assignment identifying triggers/warning signs with interventions for each, people she may call for support and warning signs she would like her family to watch for.  Pt identified  12 things to do to support recovery and 12 places she may seek employement.  Pt shared her plan to enter [facility] [place] and enter [facility] outpatient programming.  Pt appears prepared for discharge to the sober [place] on 5/30/17.    "

## 2017-05-29 NOTE — PROGRESS NOTES
MICD Discharge Summary/Instructions     Patient: Mecca Thorne  MRN: [id number]   : 1985 Age: 31 year old Sex: female   -  Focus of Treatment / Discharge Recommendations    Personal Safety/ Management of Symptoms    * Follow your safety plan.  Report increased symptoms to your care team and /or go to the nearest Emergency Department.  * Call crisis lines as needed  [facility] [phone]                [facility] [phone]  [facility] [phone]              Crisis Connection [phone]  [facility] [phone]              [facility] [phone]  [facility] [phone]          National Suicide Prevention [phone]  [facility] [phone]            Suicide Prevention [phone]  [facility] [phone]    Abstinence/Relapse Prevention  * Take all medicines as directed.  Carry a current list of medicines with you.  * Use coping skills: nutrition, rest, exercise, spirituality, journal, seek sober support, engage in activities you enjoy   * Do not use illicit (street) drugs, controlled substances (narcotics) or alcohol.    Develop/Improve Independent Living/Socialization Skills: ensure living environment is conducive to recovery     Community Resources/Supports and Discharge Planning:  Maintain abstinence from all mood altering substances, attend 2+ 12 step meetings per week, maintain contact with a program sponsor, enter [facility] sober house, enter the outpatient program at [facility] and follow recommendations, continue with Dr Byrne for continued Vivitrol, maintain good physical and mental health care  Follow up with psychiatrist / main caregiver: PCP [facility]     Follow up with your therapist: Ryanne and/or referrals from therapist, Erin Marie    Next visit: TBD    Go to group therapy and / or support groups at: outpatient program and in home community    See your medical doctor about:  Ongoing physical health  care    Client Signature:_______________________   Date / Time:___________  Staff Signature:________________________   Date / Time:___________

## 2017-05-30 NOTE — PROGRESS NOTES
5/30/17  Pt received a medallion from peers for program completion.  Pt and counselor met 1.1 to review her plan for ongoing care.  Pt's intake with Ryanne is set for 06/02/17.  Pt discharged to [facility] this date.

## 2017-06-06 NOTE — PROGRESS NOTES
CHEMICAL DEPENDENCY DISCHARGE SUMMARY       EVALUATION COUNSELOR:  Flaco Manjarrez MA, [facility].   TREATMENT COUNSELORS:  Karthik Lamar [facility] and Makeda Mcintosh [facility].   REFERRAL SOURCE:  Self.   PROGRAM:  [facility] [place] [facility] Chemical Dependency [facility] Unit   ADMISSION DATE:  05/02/2017   DISCHARGE DATE:  05/30/2017    LAST SESSION DATE: 05/29/2017.   ADMISSION DIAGNOSES:   1.  304.00/F11.20, opioid use disorder, severe.   2.  305.10/Z72.0, tobacco use disorder, mild.   DISCHARGE DIAGNOSES:     1.  304.00/F11.20, opioid use disorder, severe.   2.  305.10/Z72.0, tobacco use disorder, mild.   DISCHARGE STATUS:  The patient completed treatment plan goals and was discharged with counselor approval.   LAST USE DATE:  As provided by patient for opiates 04/29/2017.  The patient is a current tobacco user.   DAYS OF TREATMENT COMPLETED:  28.      PRESENTING INFORMATION:  Mecca Thorne came to the [facility], [facility] seeking detox from heroin.  She states that she first started opiates about 8 or 9 years ago and began using heroin in the last year.  She states that she has a diagnosis of arthritis and herniated disk in her lower back.      SERVICES PROVIDED:  Assessment, patient orientation, treatment planning, individual counseling, group therapy sessions, family therapy, spiritual care counseling, lectures, workshops focusing on relapse prevention, relationships, other addictions, recovery topics and aftercare planning.      ISSUES ADDRESSED IN TREATMENT:   DIMENSION 1:  Acute Intoxication Withdrawal Potential:  Initial risk factor 0.  Current risk factor 0.  The patient reports her last use of heroin as 04/29/2017.  The patient was detoxed using Suboxone and started on Vivitrol under the care of Dr. Byrne       DIMENSION 2:  Biomedical Conditions and Complaints:  Initial risk factor 0.  Current risk factor 0.  The patient has a medical  diagnosis of arthritis, and history of herniated disk in the lower back.      DIMENSION 3:  Emotional/Behavioral/Cognitive Conditions and Complications:  Initial risk factor 2.  Current risk factor 1.  The patient reported a history of anxiety and depression.  She was seen by Dr. Worley, she  followed recommendations of Dr. Worley to stabilize and maintain her mental health.  The patient participated in a suicide screening.  Her rating was no risk identified.  The safety plan was completed and reviewed and patient was assessed while in [facility].  The patient reports a history of low self-esteem.  The patient was able to come to believe that she was a worthwhile woman who deserves sobriety and all good life has to offer by completing the Affirmations/Book of Me assignment and practicing daily affirmations and a gratitude list.  The patient has unresolved grief regarding the loss of her father in 2012.  She began to heal from grief by attending the weekly focus group on grief and loss.  The patient reports having stress and wants to learn healthy coping skills.  The patient was given a stress reducing techniques sheet and she practiced 2 or more each day. She made progress in this dimension.     DIMENSION 4:  Readiness to Change:  Initial risk factor 1.  Current risk factor 1.  The patient has consequences to herself and others due to her use.  The patient acknowledged negative consequences through completing the consequences assignment where she identified emotional consequences, values violated and insane behaviors.  The patient also included 10 consequences that her children suffered due to her use.  The patient's motivation is her children and wants to be a good role model for them.   Patient increased internal motivation by participating in a group project where she individually created a collage about what her life would look like in 5 years, sober versus continued use.        DIMENSION 5:  Relapse,  Continued Use, Continued Problem Potential:  Initial risk factor 4.  Current risk factor 3.  This is patient's first treatment and she lacks insight into her personal relapse process, triggers, warning signs, and lacks coping skills.  The patient gained insight through attending relapse prevention workshops and completing a detailed relapse prevention plan.  The patient is entering a step-down program at [facility].  The patient reports a codependent relationship with her fiance.  The patient's goal of learning to set healthy boundaries to have interdependent relationships was supported by her reading Boundaries for Codependence and listing ways that her life had been affected and how to begin to have healthy boundaries and relationships.  The patient reports she has guilt and shame for past use and behaviors.  The patient completed the guilt, shame and self-forgiveness packets to help her begin forgiving herself for the past.  The patient reports she stuffs anger and has resentments against herself for not being who she thought she would be at this age.  The patient read materials on anger and resentments to help her develop awareness of how anger and resentments keep her stuck.  The patient reports she avoids conflict.  The patient completed the assertiveness training and Learning to Live Emotions packet to begin learning ways to communicate and talk about conflict. She made progress in this dimension.     DIMENSION 6:  Recovery Environment:  Initial risk factor 4.  Current risk factor 3.  The patient's relationship with fiance and family are strained due to use.  She provided an opportunity for open, honest communication by signing releases of information to invite fiance and family.  The patient's sister attended.  The patient reported that this was good for both of them.  The patient lacks a sober support network in recovery.  The patient spent free time with female peers and attended at least three 12-step meetings  weekly while in [facility] to help her develop relationships with women in recovery.  The patient was asked to seek an opportunity to secure a temporary sponsor through the alumni office.  She did secure a sponsor and called her prior to discharge.  The patient has few meaningful sober activities for her free time.  The patient listed 25 sober activities that she would like to do or try to learn how to have sober fun.  The patient is unemployed.  She listed 12 places that she would like to work and find employment after completion of [facility] that are conducive to recovery.  The patient's fiance uses.  Home may not be conducive to recovery.  The patient initially reported that she thought she could live with her sister temporarily.   The patient had a conversation with her sister and it was decided that the patient would be better served if she were to enter a sober house with continued programming.  The patient made some progress in this dimension.      STRENGTHS AS IDENTIFIED BY PATIENT:  She is intelligent, focused, positive, and optimistic.  The patient was a positive group member whose manner was quiet and polite.      PROGNOSIS:  Favorable if she follows all aftercare recommendations and referrals.        LIVING ARRANGEMENTS AT DISCHARGE:  [facility].      CONTINUING CARE RECOMMENDATIONS AND REFERRALS:   1.  Abstain from all mood-altering substances except those prescribed if nonaddictive.   2.  Attend at least two 12-step meetings weekly, and/or follow [facility] recommendations.   3.  Maintain contact with female sponsor.   4.  Enter aftercare at [facility] until discharged with counselor approval.   5.  Comply with rules and expectations of [facility] and sober [place].   6.  Monitor and comply with the advice of your doctor regarding mental and physical health.  Remain medication compliant.   7.  Continue investment in building sober support network in recovery.   8.  Continue monitoring and understanding  of relapse triggers and stressors through the use and development of healthy coping skills.   9.  Continue to pursue employment and/or volunteer opportunities for sober meaningful activities.       CC:   Ryanne ATT: Sera  [phone]    This information has been disclosed to you from records protected by Federal confidentiality rules (42 CFR part 2). The Federal rules prohibit you from making any further disclosure of this information unless further disclosure is expressly permitted by the written consent of the person to whom it pertains or as otherwise permitted by 42 CFR part 2. A general authorization for the release of medical or other information is NOT sufficient for this purpose. The Federal rules restrict any use of the information to criminally investigate or prosecute any alcohol or drug abuse patient.      JENIFFER KNOWLES, [facility]             D: 2017 14:52   T: 2017 16:08   MT: CHIKA      Name:     KRISTA NI   MRN:      [id number]        Account:      [id number]   :      1985           Visit Date:   2017      Document: [id number]

## 2017-06-09 ENCOUNTER — INFUSION THERAPY VISIT (OUTPATIENT)
Dept: INFUSION THERAPY | Facility: CLINIC | Age: 32
End: 2017-06-09
Attending: PEDIATRICS
Payer: COMMERCIAL

## 2017-06-09 VITALS
TEMPERATURE: 98 F | OXYGEN SATURATION: 97 % | RESPIRATION RATE: 16 BRPM | SYSTOLIC BLOOD PRESSURE: 112 MMHG | HEART RATE: 85 BPM | DIASTOLIC BLOOD PRESSURE: 70 MMHG

## 2017-06-09 DIAGNOSIS — F11.20 OPIOID USE DISORDER, SEVERE, DEPENDENCE (H): Primary | ICD-10-CM

## 2017-06-09 PROCEDURE — 25000128 H RX IP 250 OP 636: Performed by: PEDIATRICS

## 2017-06-09 PROCEDURE — 96372 THER/PROPH/DIAG INJ SC/IM: CPT

## 2017-06-09 RX ADMIN — NALTREXONE 380 MG: KIT at 11:10

## 2017-06-09 ASSESSMENT — PAIN SCALES - GENERAL: PAINLEVEL: MILD PAIN (3)

## 2017-06-09 NOTE — MR AVS SNAPSHOT
"              After Visit Summary   6/9/2017    Mecca Thorne    MRN: [id number]           Patient Information     Date Of Birth          1985        Visit Information        Provider Department      6/9/2017 10:15 AM [facility]        Today's Diagnoses     Opioid use disorder, severe, dependence (H)    -  1       Follow-ups after your visit        Your next 10 appointments already scheduled     Mark 15, 2017 10:00 AM CDT   Return Visit with Mecca Byrne MD   [facility] ([facility])    [address] [place] [address]  [address]  [facility] [address] [phone]            Jul 05, 2017 12:00 PM CDT   Level O with UR CH 05   [facility] ([facility])    [address] [place] [address].  [address]  [facility] [address]   [phone]              Who to contact     If you have questions or need follow up information about today's clinic visit or your schedule please contact [facility] directly at [phone].  Normal or non-critical lab and imaging results will be communicated to you by [employer]2 [employer]art, letter or phone within 4 business days after the clinic has received the results. If you do not hear from us within 7 days, please contact the clinic through [employer] or phone. If you have a critical or abnormal lab result, we will notify you by phone as soon as possible.  Submit refill requests through [email or web address] or call your pharmacy and they will forward the refill request to us. Please allow 3 business days for your refill to be completed.          Additional Information About Your Visit        T2 Systemshart Information     [email or web address] lets you send messages to your doctor, view your test results, renew your prescriptions, schedule appointments and more. To sign up, go to [email or web address] . Click on \"Log in\" on the left side of the screen, which will take " "you to the Welcome page. Then click on \"Sign up Now\" on the right side of the page.     You will be asked to enter the access code listed below, as well as some personal information. Please follow the directions to create your username and password.     Your access code is: [email or web address]  Expires: 2017  1:10 PM     Your access code will  in 90 days. If you need help or a new code, please call your [place] clinic or [phone].        Care EveryWhere ID     This is your Care EveryWhere ID. This could be used by other organizations to access your [place] medical records  [id number]        Your Vitals Were     Pulse Temperature Respirations Pulse Oximetry          85 98  F (36.7  C) (Oral) 16 97%         Blood Pressure from Last 3 Encounters:   17 112/70   05/15/17 110/62   17 124/78    Weight from Last 3 Encounters:   17 70.5 kg (155 lb 8 oz)              Today, you had the following     No orders found for display       Primary Care Provider Office Phone # Fax #    M Erum Marx [phone]       [place] [address]        Thank you!     Thank you for choosing [facility]  for your care. Our goal is always to provide you with excellent care. Hearing back from our patients is one way we can continue to improve our services. Please take a few minutes to complete the written survey that you may receive in the mail after your visit with us. Thank you!             Your Updated Medication List - Protect others around you: Learn how to safely use, store and throw away your medicines at www.disposemymeds.org.          This list is accurate as of: 17 11:27 AM.  Always use your most recent med list.                   Brand Name Dispense Instructions for use    acetaminophen 325 MG tablet    TYLENOL    100 tablet    Take 1-2 tablets (325-650 mg) by mouth every 4 hours as needed for mild pain or fever    "    DULoxetine 30 MG EC capsule    CYMBALTA    90 capsule    Take 1 capsule (30 mg) by mouth 2 times daily Take 2 tab qam and 1 tab qhs       gabapentin 300 MG capsule    NEURONTIN    90 capsule    Take 1 capsule (300 mg) by mouth 3 times daily as needed (anxiety)       hydrOXYzine 25 MG tablet    ATARAX    120 tablet    Take 1-2 tablets (25-50 mg) by mouth every 6 hours as needed for anxiety       naloxone 1 mg/mL for intranasal kit (2 syringes with 2 mucosal atomizer device)    NARCAN    1 kit    For opioid overdose, spray one-half syringe contents into each nostril.  Repeat after 3 minutes if no or minimal response.       naproxen 500 MG tablet    NAPROSYN    60 tablet    Take 1 tablet (500 mg) by mouth 2 times daily as needed for moderate pain       tiZANidine 2 MG tablet    ZANAFLEX    12 tablet    Take 1 tablet (2 mg) by mouth nightly as needed for muscle spasms       traZODone 50 MG tablet    DESYREL    90 tablet    Take 1-3 tablets ( mg) by mouth nightly as needed for sleep

## 2017-06-09 NOTE — PROGRESS NOTES
"Infusion Nursing Note:  Mecca Thorne presents today for vivitrol.    Patient seen by provider today: No   present during visit today: Not Applicable.    Note: Patient states she is doing well.  She is currently residing in a sober house. Her cravings have been reduced.    Intravenous Access:  No Intravenous access/labs at this visit.    Post Infusion Assessment:  Patient tolerated injection without incident.  Vivitrol injection given in left glut with 2\" needle.    Discharge Plan:   Patient discharged in stable condition accompanied by: self.  Departure Mode: Ambulatory.  Will return to clinic next month.  Melony Grajeda RN                        "

## 2017-07-03 DIAGNOSIS — F11.20 OPIOID USE DISORDER, SEVERE, DEPENDENCE (H): Primary | ICD-10-CM

## 2017-07-11 ENCOUNTER — INFUSION THERAPY VISIT (OUTPATIENT)
Dept: INFUSION THERAPY | Facility: CLINIC | Age: 32
End: 2017-07-11
Attending: PEDIATRICS
Payer: COMMERCIAL

## 2017-07-11 VITALS
DIASTOLIC BLOOD PRESSURE: 73 MMHG | OXYGEN SATURATION: 100 % | RESPIRATION RATE: 16 BRPM | TEMPERATURE: 98.7 F | SYSTOLIC BLOOD PRESSURE: 122 MMHG | HEART RATE: 85 BPM

## 2017-07-11 DIAGNOSIS — F11.20 OPIOID USE DISORDER, SEVERE, DEPENDENCE (H): Primary | ICD-10-CM

## 2017-07-11 PROCEDURE — 25000128 H RX IP 250 OP 636: Performed by: PEDIATRICS

## 2017-07-11 PROCEDURE — 96372 THER/PROPH/DIAG INJ SC/IM: CPT

## 2017-07-11 RX ADMIN — NALTREXONE 380 MG: KIT at 12:55

## 2017-07-11 NOTE — PROGRESS NOTES
Infusion Nursing Note:  Mecca Thorne presents today for naltrexone injection .    Patient seen by provider today: No   present during visit today: Not Applicable.    Note: Reminded patient to make F/U appt with Dr. Byrne.    Intravenous Access:  No Intravenous access/labs at this visit.    Treatment Conditions:  Denies using..      Post Infusion Assessment:  Patient tolerated injection without incident.    Discharge Plan:   Discharge instructions reviewed with: Patient.  Patient discharged in stable condition accompanied by: self.  Departure Mode: Ambulatory.    Sophia Rdz RN

## 2019-07-09 NOTE — MR AVS SNAPSHOT
"              After Visit Summary   7/11/2017    Mecca Thorne    MRN: [id number]           Patient Information     Date Of Birth          1985        Visit Information        Provider Department      7/11/2017 12:30 PM [facility]        Today's Diagnoses     Opioid use disorder, severe, dependence (H)    -  1       Follow-ups after your visit        Your next 10 appointments already scheduled     Aug 08, 2017 12:30 PM CDT   Level O with [facility] 05   [facility] ([facility]    [address] [place] [id number]   [phone]              Who to contact     If you have questions or need follow up information about today's clinic visit or your schedule please contact [facility] directly at [phone].  Normal or non-critical lab and imaging results will be communicated to you by sevenloadhart, letter or phone within 4 business days after the clinic has received the results. If you do not hear from us within 7 days, please contact the clinic through MyChart or phone. If you have a critical or abnormal lab result, we will notify you by phone as soon as possible.  Submit refill requests through Soko or call your pharmacy and they will forward the refill request to us. Please allow 3 business days for your refill to be completed.          Additional Information About Your Visit        MyChart Information     Soko lets you send messages to your doctor, view your test results, renew your prescriptions, schedule appointments and more. To sign up, go to [email or web address] . Click on \"Log in\" on the left side of the screen, which will take you to the Welcome page. Then click on \"Sign up Now\" on the right side of the page.     You will be asked to enter the access code listed below, as well as some personal information. Please follow the directions to create your username and " password.     Your access code is: [email or web address]  Expires: 2017  1:10 PM     Your access code will  in 90 days. If you need help or a new code, please call your [facility] or [phone].        Care EveryWhere ID     This is your Care EveryWhere ID. This could be used by other organizations to access your [place] medical records  [id number]        Your Vitals Were     Pulse Temperature Respirations Pulse Oximetry          85 98.7  F (37.1  C) 16 100%         Blood Pressure from Last 3 Encounters:   17 122/73   17 112/70   05/15/17 110/62    Weight from Last 3 Encounters:   17 70.5 kg (155 lb 8 oz)              Today, you had the following     No orders found for display       Primary Care Provider Office Phone # Fax #    M Erum Marx [phone]       [place] [id number]        Equal Access to Services     ALCIDES LAINEZ : Hadii aad ku hadasho Soomaali, waaxda luqadaha, qaybta kaalmada adeegyada, waxay idiin hayaan adeeg kharash lacurly . So [facility] [phone].    ATENCIÓN: Si habla español, tiene a chandler disposición servicios gratuitos de asistencia lingüística. [facility] [phone].    We comply with applicable federal civil rights laws and [place] laws. We do not discriminate on the basis of race, color, national origin, age, disability sex, sexual orientation or gender identity.            Thank you!     Thank you for choosing [facility]  for your care. Our goal is always to provide you with excellent care. Hearing back from our patients is one way we can continue to improve our services. Please take a few minutes to complete the written survey that you may receive in the mail after your visit with us. Thank you!             Your Updated Medication List - Protect others around you: Learn how to safely use, store and throw away your medicines at www.disposemymeds.org.          This list is  accurate as of: 7/11/17  1:00 PM.  Always use your most recent med list.                   Brand Name Dispense Instructions for use Diagnosis    acetaminophen 325 MG tablet    TYLENOL    100 tablet    Take 1-2 tablets (325-650 mg) by mouth every 4 hours as needed for mild pain or fever    Chronic nonmalignant pain, Opioid use disorder, severe, dependence (H)       DULoxetine 30 MG EC capsule    CYMBALTA    90 capsule    Take 1 capsule (30 mg) by mouth 2 times daily Take 2 tab qam and 1 tab qhs    ZBIGNIEW (generalized anxiety disorder)       gabapentin 300 MG capsule    NEURONTIN    90 capsule    Take 1 capsule (300 mg) by mouth 3 times daily as needed (anxiety)    Opioid use disorder, severe, dependence (H), Opioid dependence with withdrawal (H)       hydrOXYzine 25 MG tablet    ATARAX    120 tablet    Take 1-2 tablets (25-50 mg) by mouth every 6 hours as needed for anxiety    Opioid dependence with withdrawal (H)       naloxone 1 mg/mL for intranasal kit (2 syringes with 2 mucosal atomizer device)    NARCAN    1 kit    For opioid overdose, spray one-half syringe contents into each nostril.  Repeat after 3 minutes if no or minimal response.    Opioid use disorder, severe, dependence (H)       naproxen 500 MG tablet    NAPROSYN    60 tablet    Take 1 tablet (500 mg) by mouth 2 times daily as needed for moderate pain    Chronic nonmalignant pain       tiZANidine 2 MG tablet    ZANAFLEX    12 tablet    Take 1 tablet (2 mg) by mouth nightly as needed for muscle spasms    ZBIGNIEW (generalized anxiety disorder)       traZODone 50 MG tablet    DESYREL    90 tablet    Take 1-3 tablets ( mg) by mouth nightly as needed for sleep    Opioid dependence with withdrawal (H), Opioid use disorder, severe, dependence (H)          no fever and no chills.